# Patient Record
Sex: FEMALE | Race: WHITE | NOT HISPANIC OR LATINO | URBAN - METROPOLITAN AREA
[De-identification: names, ages, dates, MRNs, and addresses within clinical notes are randomized per-mention and may not be internally consistent; named-entity substitution may affect disease eponyms.]

---

## 2024-04-30 ENCOUNTER — TELEPHONE (OUTPATIENT)
Age: 44
End: 2024-04-30

## 2024-04-30 NOTE — TELEPHONE ENCOUNTER
Caller: Fredy    Doctor:     Reason for call: Returning call. Call for for her friend not for herself    Call back#:

## 2024-05-14 ENCOUNTER — TELEPHONE (OUTPATIENT)
Dept: SURGERY | Facility: CLINIC | Age: 44
End: 2024-05-14

## 2024-05-15 ENCOUNTER — CONSULT (OUTPATIENT)
Dept: SURGERY | Facility: CLINIC | Age: 44
End: 2024-05-15
Payer: COMMERCIAL

## 2024-05-15 VITALS
WEIGHT: 135 LBS | HEART RATE: 90 BPM | HEIGHT: 65 IN | TEMPERATURE: 98 F | OXYGEN SATURATION: 98 % | BODY MASS INDEX: 22.49 KG/M2

## 2024-05-15 DIAGNOSIS — E04.1 THYROID NODULE: ICD-10-CM

## 2024-05-15 DIAGNOSIS — E04.1 THYROID NODULE: Primary | ICD-10-CM

## 2024-05-15 PROCEDURE — 99244 OFF/OP CNSLTJ NEW/EST MOD 40: CPT | Performed by: SPECIALIST

## 2024-05-15 NOTE — LETTER
"May 30, 2024     Mercedes Dupree MD  [address] [place] [id number]    Patient: Fredy Tran   YOB: 1980   Date of Visit: 5/15/2024       Dear Mercedes,    Thank you for referring Fredy Tran to me for evaluation. Below are my notes for this consultation.    If you have questions, please do not hesitate to call me. I look forward to following your patient along with you.         Sincerely,    King Rupesh Carr MD        CC: No Recipients    Rupesh Carr MD  5/30/2024  5:21 PM  Sign when Signing Visit  Chief Complaint: Multiple thyroid nodules      History of Present Illness: The patient is a 44-year-old [place]istanian female with a fairly significant past medical history.  She has a history of AVM on 2 separate occasions.  In 2006 she underwent treatment in [place].  In 2008 she underwent treatment in [place].  She had \"brain surgery\" and underwent embolization.  She also has a history of meningioma since 2018 that has not gotten larger and is being surveilled.    She presents the office today with multiple thyroid nodules.  She had an ultrasound of the thyroid in her family physician's office that demonstrated multiple bilateral thyroid nodules.  At that time she was referred to our office for evaluation.  She does have a presumed history of hyperthyroidism.  She denies any family history of endocrine issues.  Denies any history of radiation to the face head or neck.      Past Medical History: History reviewed. No pertinent past medical history.      Past Surgical History:  History reviewed. No pertinent surgical history.      Allergies:    Allergies   Allergen Reactions   • Ceftriaxone Anaphylaxis     Had allergic rxn to Medaxonum, generic name Ceftriaxone   • Iv Contrast [Iodinated Contrast Media] Anaphylaxis   • Other Anaphylaxis     MEDAXONUM         Medications:    Current Outpatient Medications:   •  levETIRAcetam (KEPPRA) 1000 MG tablet, Take 1 tablet (1,000 mg total) by mouth daily, Disp: 90 " tablet, Rfl: 3  •  hydrocortisone (ANUSOL-HC) 2.5 % rectal cream, Apply topically 2 (two) times a day, Disp: 28 g, Rfl: 0  •  ibuprofen (MOTRIN) 400 mg tablet, Take 1 tablet (400 mg total) by mouth 3 (three) times a day with meals for 10 days, Disp: 60 tablet, Rfl: 0  •  Varenicline Tartrate, Starter, (Chantix Starting Month Pak) 0.5 MG X 11 & 1 MG X 42 TBPK, 0.5 mg once daily for 3 days then 0.5mg twice daily for days 4-7 then 1 mg twice daily (Patient not taking: Reported on 5/23/2024), Disp: 53 each, Rfl: 0      Social History:  Social History    Social History     Substance and Sexual Activity   Alcohol Use Not Currently     Social History     Substance and Sexual Activity   Drug Use Not Currently     Social History     Tobacco Use   Smoking Status Every Day   • Current packs/day: 1.00   • Types: Cigarettes   Smokeless Tobacco Never         Family History:  History reviewed. No pertinent family history.      Review of Systems:    She has a history of headaches intermittently.  Also some shoulder pain.  No weight loss weight gain fever chills night sweats chest pain nausea vomiting diarrhea constipation shortness of breath    Vitals:  Vitals:    05/15/24 1331   Pulse: 90   Temp: 98 °F (36.7 °C)   SpO2: 98%       Physical Exam:  Patient is an adult [place] female 5 foot 5 inches 135 pounds.  She is awake alert no distress.    Vital signs as above    Neck soft bilateral small nodules noted right greater than left.  Nontender to palpation.  No evidence of cervical adenopathy noted.      Lab Results: I have personally reviewed pertinent reports. See below.  Imaging: I have personally reviewed pertinent reports.   EKG, Pathology, and Other Studies: I have personally reviewed pertinent reports.     No visits with results within 1 Day(s) from this visit.   Latest known visit with results is:   No results found for any previous visit.         Impression:  Thyroid nodules.  Further investigation is needed.  The patient  needs a formal ultrasound to check the size, consistency, etc. etc. of the lesions.    Plan:  Schedule ultrasound of the thyroid gland at the earliest possible time

## 2024-05-16 ENCOUNTER — HOSPITAL ENCOUNTER (OUTPATIENT)
Dept: ULTRASOUND IMAGING | Facility: HOSPITAL | Age: 44
End: 2024-05-16
Attending: SPECIALIST
Payer: COMMERCIAL

## 2024-05-16 DIAGNOSIS — E04.1 THYROID NODULE: ICD-10-CM

## 2024-05-16 PROCEDURE — 76536 US EXAM OF HEAD AND NECK: CPT

## 2024-05-23 ENCOUNTER — OFFICE VISIT (OUTPATIENT)
Dept: GASTROENTEROLOGY | Facility: CLINIC | Age: 44
End: 2024-05-23
Payer: COMMERCIAL

## 2024-05-23 VITALS
BODY MASS INDEX: 22.66 KG/M2 | OXYGEN SATURATION: 98 % | SYSTOLIC BLOOD PRESSURE: 93 MMHG | WEIGHT: 136 LBS | HEIGHT: 65 IN | TEMPERATURE: 99.3 F | HEART RATE: 71 BPM | DIASTOLIC BLOOD PRESSURE: 62 MMHG

## 2024-05-23 DIAGNOSIS — K64.9 HEMORRHOIDS, UNSPECIFIED HEMORRHOID TYPE: ICD-10-CM

## 2024-05-23 DIAGNOSIS — K59.00 CONSTIPATION, UNSPECIFIED CONSTIPATION TYPE: Primary | ICD-10-CM

## 2024-05-23 PROCEDURE — 99244 OFF/OP CNSLTJ NEW/EST MOD 40: CPT | Performed by: DIETITIAN, REGISTERED

## 2024-05-23 RX ORDER — HYDROCORTISONE 25 MG/G
CREAM TOPICAL 2 TIMES DAILY
Qty: 28 G | Refills: 0 | Status: SHIPPED | OUTPATIENT
Start: 2024-05-23

## 2024-05-23 NOTE — PATIENT INSTRUCTIONS
Fredy Tran  5/23/2024     Recommended Total Fiber Intake**    AGE  MEN  WOMAN    19-50  38 grams/day  25 grams/day    Over 50  30 grams/day  21 grams/day    Fiber Sources in Common Foods   Use this guide to find out if you have enough fiber in you diet.    Food  Size of Serving  Fiber Grams/Servings  Calories/   Serving  Food  Size of Serving  Fiber Grams/Servings  Calories/   Serving    Fruits: (raw unless otherwise noted  Vegetables: (cooked, unless otherwise noted)    Apple (w/peel)  1 medium  3.7  81  Artichoke  1 globe  6.5  60    Apricots  1 cup  3.7  74  Asparagus  ½ cup  1.8  25    Banana  1 medium  2.7  105  Beans:    Blackberries  1 cup  7.2  75  Green (canned)  ½ cup  1.3  14    Blueberries  1 cup  3.9  81  Kidney  ½ cup  5.7  114    Cantaloupe  1 cup  1.3  56  Lima  ½ cup  6.1  85    Grapefruit  1 medium  2.8  82  Mcleod  ½ cup  7.4  118    Grapes  1 cup  1.6  114  White  ½ cup  5.5  122    Orange  1 medium  3.1  62  Beets  ½ cup  1.6  37    Pear (with peel)  1 medium  4.0  98  Broccoli  ½ cup  2.8  26    Pineapple  1 cup  1.9  76  Cabbage, green  ½ cup  2.1  16    Plums  1 medium  1.0  36  Cabbage, green (raw)  ½ cup  0.8  9    Prunes (dried)  1 cup  11.4  386  Carrots  ½ cup  2.6  35    Raspberries  1 cup  8.4  60  Cauliflower  ½ cup  2.0  17    Strawberries  1 cup  3.4  45  Cauliflower (raw)  ½ cup  1.3  13    Watermelon  1 slice  0.8  51  Celery (raw)  ½ cup  1.0  10    GRAIN PRODUCTS AND OTHERS:  Corn  ½ cup  2.0  66    Bread:  Cucumber (raw)  ½ cup  0.4  7    Belizean  1 slice  0.8  68  Eggplant  ½ cup  1.2  13    Rye  1 slice  1.6  67  Green Peas  ½ cup  4.4  62    White  1 slice  0.6  67  Lettuce, iceberg (raw)  ½ cup  0.4  4    Whole Wheat  1 slice  2.0  70  Onions (raw)  ½ cup  1.4  30    Cereal:  Potato (baked with skin)  ½ cup  1.5  66    Bran  1 ounce  9.7  70  Spinach  ½ cup  2.7  25    Corn Flakes  1 ounce  1.0  110  Tomato  ½ cup  1.0  19    Oat Bran  1 ounce  4.3  69  Zucchini  ½ cup   1.3  14    Oatmeal  1 ounce  3.0  109  METAMUCIL:    Shredded Wheat  1 ounce  2.8  102  Capsules  6 capsules  3.0  10    Crackers:  Smooth Texture Orange (sugar free)  1 tsp  3.0  20    Bladimir  1 square  0.1  27  Smooth Texture Orange (with sugar)  1 tbsp  3.0  45    Saltine  1 regular  0.1  13  Wafers  2 wafers  3.0  120    Rice:    Brown  ½ cup  1.8  108    White  ½ cup  0.3  103    Spaghetti  2 ounces  2.1  225    Almonds (roasted)  ½ cup  6.4  351    Peanuts (roasted)  ½ cup  6.1  388      ** [employer], [employer] Sciences, 2002   Track your fiber intake for five days. Use the Fiber Source Guide to find out how much fiber is in common food.     If you’re not getting your recommended amount of fiber each day, talk to your doctor about how you can increase the fiber in your diet. Example  Monday Tuesday Wednesday Thursday Friday    Food  Oatmeal    Fiber Grams  2.8    Food  Blueberries    Fiber Grams  3.9    Food  W.W. Bread    Fiber Grams  1.9    Food  W.W. Bread    Fiber Grams  1.9    Food  Apple    Fiber Grams  3.7    Food  Spaghetti    Fiber Grams  .14    Food  Corn    Fiber Grams  2.0    Food  White Bread    Fiber Grams  .6    Food    Fiber Grams    Food    Fiber Grams    Food    Fiber Grams    Food    Fiber Grams    Food    Fiber Grams    Food    Fiber Grams    Food    Fiber Grams    Food    Fiber Grams    Food    Fiber Grams    Food    Fiber Grams    Food    Fiber Grams    Food    Fiber Grams    Add numbers in each column to find your daily fiber intake.    Total Daily Fiber Intake  18.2      Too Low - Like most Americans, this example is not enough fiber. Talk to your doctor about how to add fiber to your diet.   Quick Fiber Facts    Most Americans consume only about half of the recommended fiber they need each day.    Fiber helps maintain normal bowel function, and helps prevent constipation and its potential complications. Straining and pressure from constipation may lead to  diverticular disease and hemorrhoids.    Stool softeners or stimulant laxatives only offer short-term relief of constipation, while dietary changes or fiber therapies help break the cycle of irregularity.    Diets low in saturated fat and cholesterol that include 7 grams of soluble fiber per day from psyllium husk, as in Metamucil, may reduce the risk of heart disease by lowering cholesterol. One adult dose of Metamucil has 2.4 grams of this soluable fiber.    Increase fiber intake gradually, giving the body time to adjust.       High Fiber Diet   WHAT YOU NEED TO KNOW:   What is a high-fiber diet?  A high-fiber diet includes foods that have a high amount of fiber. Fiber is the part of fruits, vegetables, and grains that is not broken down by your body. Fiber keeps your bowel movements regular. Fiber can also help lower your cholesterol level, control blood sugar in people with diabetes, and relieve constipation. Fiber can also help you control your weight because it helps you feel full faster. Most adults should eat 25 to 35 grams of fiber each day. Talk to your dietitian or healthcare provider about the amount of fiber you need.  What foods are good sources of fiber?       Foods with at least 4 grams of fiber per serving:      ? to ½ cup of high-fiber cereal (check the nutrition label on the box)    ½ cup of blackberries or raspberries    4 dried prunes    1 cooked artichoke    ½ cup of cooked legumes, such as lentils, or red, kidney, and betts beans    Foods with 1 to 3 grams of fiber per servin slice of whole-wheat, pumpernickel, or rye bread    ½ cup of cooked brown rice    4 whole-wheat crackers    1 cup of oatmeal    ½ cup of cereal with 1 to 3 grams of fiber per serving (check the nutrition label on the box)    1 small piece of fruit, such as an apple, banana, pear, kiwi, or orange    3 dates    ½ cup of canned apricots, fruit cocktail, peaches, or pears    ½ cup of raw or cooked vegetables, such as  carrots, cauliflower, cabbage, spinach, squash, or corn  What are some ways that I can increase fiber in my diet?   Choose brown or wild rice instead of white rice.     Use whole wheat flour in recipes instead of white or all-purpose flour.     Add beans and peas to casseroles or soups.     Choose fresh fruit and vegetables with peels or skins on instead of juices.    What other guidelines should I follow?   Add fiber to your diet slowly.  You may have abdominal discomfort, bloating, and gas if you add fiber to your diet too quickly.     Drink plenty of liquids as you add fiber to your diet.  You may have nausea or develop constipation if you do not drink enough water. Ask how much liquid to drink each day and which liquids are best for you.    CARE AGREEMENT:   You have the right to help plan your care. Discuss treatment options with your healthcare provider to decide what care you want to receive. You always have the right to refuse treatment. The above information is an  only. It is not intended as medical advice for individual conditions or treatments. Talk to your doctor, nurse or pharmacist before following any medical regimen to see if it is safe and effective for you.  © Copyright [employer] 2023 Information is for End User's use only and may not be sold, redistributed or otherwise used for commercial purposes.

## 2024-05-23 NOTE — PROGRESS NOTES
[facility] - Outpatient Consultation New Patient  Fredy Tran 44 y.o. female MRN: [id number]  Encounter: [id number]          ASSESSMENT AND PLAN:    1.  Constipation  2.  Hemorrhoids  Patient reports chronic abdominal bloating as well as constipation with difficulty passing bowel movements, with sensation that rectum is too tight.  She has 2-3 BMs per day on average, but has difficulty passing stools.  She denies any abdominal pain, blood in the stool, rectal bleeding, weight loss, issues with urination, hemorrhoids.  She reports she has had hemorrhoids in the past.  She has had 2 children.  She eats lots of fruits and veggies, but admits that she can likely improve her water intake.  She just recently moved here from [facility] and she has had an EGD and colonoscopy done about 3 years ago while living there, no records currently available, but she reports these were normal.    -Increase water intake to goal of at least 64 ounces daily.  -Maintain excellent fiber intake, provided handouts.  -Schedule anal manometry.  Consider referral to pelvic floor physical therapy pending results.      Follow up 3 months.    ________________________________________________________    HPI:  Fredy Tran is a 44 y.o. female with history of hyperthyroidism, thyroid nodule, seizures, and tobacco abuse who presents for evaluation of constipation.  TSH, CMP and CBC normal other than WBC 14 on 5/7/2024.    Patient is accompanied by her sister who helps with translation.  Patient also speaks English.  Patient reports chronic abdominal bloating as well as constipation with difficulty passing bowel movements, with sensation that rectum is too tight.  She has 2-3 BMs per day on average, but has difficulty passing stools.  She denies any abdominal pain, blood in the stool, rectal bleeding, weight loss, issues with urination, hemorrhoids.  She reports she has had hemorrhoids in the past.  She has had 2 children.   She eats lots of fruits and veggies, but admits that she can likely improve her water intake.  She just recently moved here from [facility] and she has had an EGD and colonoscopy done about 3 years ago while living there, no records currently available, but she reports these were normal.      REVIEW OF SYSTEMS:  10 point ROS reviewed and negative, except as above    Historical Information   No past medical history on file.  No past surgical history on file.  Social History   Social History     Substance and Sexual Activity   Alcohol Use None     Social History     Substance and Sexual Activity   Drug Use Not on file     Social History     Tobacco Use   Smoking Status Every Day    Current packs/day: 1.00    Types: Cigarettes   Smokeless Tobacco Not on file     No family history on file.    Meds/Allergies       Current Outpatient Medications:     azithromycin (Zithromax) 250 mg tablet    ibuprofen (MOTRIN) 400 mg tablet    levETIRAcetam (KEPPRA) 1000 MG tablet    Varenicline Tartrate, Starter, (Chantix Starting Month Pak) 0.5 MG X 11 & 1 MG X 42 TBPK    Allergies   Allergen Reactions    Ceftriaxone Anaphylaxis     Had allergic rxn to Medaxonum, generic name Ceftriaxone    Iv Contrast [Iodinated Contrast Media] Anaphylaxis    Other Anaphylaxis     MEDAXONUM           Objective   Wt Readings from Last 3 Encounters:   05/20/24 61.2 kg (135 lb)   05/15/24 61.2 kg (135 lb)   04/30/24 61.2 kg (135 lb)     Temp Readings from Last 3 Encounters:   05/20/24 98.3 °F (36.8 °C) (Temporal)   05/15/24 98 °F (36.7 °C) (Tympanic)   04/30/24 98.3 °F (36.8 °C) (Temporal)     BP Readings from Last 3 Encounters:   05/20/24 96/78   04/30/24 94/72   11/16/23 102/64     Pulse Readings from Last 3 Encounters:   05/20/24 97   05/15/24 90   04/30/24 90        PHYSICAL EXAM:     Physical Exam  Vitals reviewed. Exam conducted with a chaperone present.   Constitutional:       General: She is not in acute distress.     Appearance: She is well-developed.    HENT:      Head: Normocephalic and atraumatic.   Eyes:      Conjunctiva/sclera: Conjunctivae normal.   Cardiovascular:      Rate and Rhythm: Normal rate and regular rhythm.      Heart sounds: No murmur heard.  Pulmonary:      Effort: Pulmonary effort is normal. No respiratory distress.      Breath sounds: Normal breath sounds.   Abdominal:      General: Bowel sounds are normal. There is no distension.      Palpations: Abdomen is soft.      Tenderness: There is no abdominal tenderness. There is no guarding.   Genitourinary:     Rectum: No tenderness. Abnormal anal tone.      Comments: External skin tag noted.  Hypertonic anal tone.  Musculoskeletal:         General: No swelling.      Cervical back: Neck supple.   Skin:     General: Skin is warm and dry.   Neurological:      Mental Status: She is alert.   Psychiatric:         Mood and Affect: Mood normal.             Lab Results:   No visits with results within 1 Day(s) from this visit.   Latest known visit with results is:   No results found for any previous visit.       Lab Results   Component Value Date    HGB 7.1 (L) 10/31/2023       Lab Results   Component Value Date    SODIUM 142 05/07/2024    K 4.5 05/07/2024     05/07/2024    CO2 28 05/07/2024    AGAP 9 05/07/2024    BUN 17 05/07/2024    CREATININE 0.55 05/07/2024    GLUC 88 05/07/2024    CALCIUM 9.6 05/07/2024    AST 10 05/07/2024    ALT 9 05/07/2024    ALKPHOS 58 05/07/2024    TP 7.0 05/07/2024    TBILI 0.4 05/07/2024    EGFR 116 05/07/2024         Radiology Results:   US thyroid    Result Date: 5/21/2024  Narrative: THYROID ULTRASOUND INDICATION: E04.1: Nontoxic single thyroid nodule. COMPARISON: None TECHNIQUE: Ultrasound of the thyroid was performed with a high frequency linear transducer in transverse and sagittal planes including volumetric imaging sweeps as well as traditional still imaging technique. FINDINGS: Thyroid texture: Normal homogeneous smooth echotexture. Right lobe: 5.4 x 2.3 x 1.9  cm. Volume 10.9 mL Left lobe: 5.2 x 1.7 x 1.7 cm. Volume 7.2 mL Isthmus: 0.4 cm. Nodule #1. Image 11. RIGHT lower pole nodule measuring 1.8 x 1.2 x 1.8 cm. COMPOSITION: 2 points, solid or almost completely solid. Multiple small cysts are present albeit subjectively less than 25% of the nodule substance. ECHOGENICITY: 1 point, hyperechoic or isoechoic. SHAPE: 0 points, wider-than-tall. MARGIN: 0 points, smooth. ECHOGENIC FOCI: 0 points, none or large comet-tail artifacts. TI-RADS Classification: TR 3 (3 points), FNA if >2.5 cm.  Follow if >1.5 cm. There are additional nodules of lesser size and/or TI-RADS score. These do not necessitate additional evaluation based on ACR criteria.     Impression: Multinodular thyroid gland. No nodule meets current ACR criteria for requiring biopsy but follow-up ultrasound is recommended in 1 year. Reference: ACR Thyroid Imaging, Reporting and Data System (TI-RADS): White Paper of the ACR TI-RADS Committee. J AM Fuad Radiol 2017;14:587-595. Additional recommendations based on American Thyroid Association 2015 guidelines. This study demonstrates a finding requiring imaging follow-up and was documented as such in Epic. Workstation performed: [id number]     US breast right limited (diagnostic)    Result Date: 5/14/2024  Narrative: This result has an attachment that is not available. HISTORY: Patient is 44 years old and is seen for a diagnostic mammogram and ultrasound of the right breast. No relevant medical history has been documented for this patient. Surgical history includes breast biopsy and breast enhancement. No relevant family history has been documented for this patient. No relevant hormone history has been documented for this patient. FILMS COMPARED: The present examination has been compared to prior imaging studies. Mammogram Findings: A diagnostic mammogram was performed. Computer-aided detection was utilized by the radiologist in the interpretation of this examination. The  right breast is heterogeneously dense, which may obscure small masses. Additional imaging of the right breast was performed and read in conjunction with the recent baseline mammogram dated 5/6/2024.  The questioned asymmetry in the inferior right breast persists on additional mammographic imaging.  On the mediolateral view, there is question of a 2.5 cm mass in this region. Ultrasound Findings: Real-time directed ultrasound of the  breast was performed. Ultrasound evaluation of the right breast was performed and targeted to the area of initial mammographic concern.  Within the 5:00 axis, located 5-6 cm from the nipple and seen along the implant capsule, there is an anechoic avascular mass most consistent with a simple cyst.  This measures 3.3 x 0.7 x 2.4 cm in maximum dimensions and is consistent with the mass seen on the mammogram. Within the 5:30-6:00 axis, located 10-12 cm from the nipple, seen along the implant and in the region of the patient's scar,  there are patchy areas of mixed echogenicity with small cysts most consistent with areas of fibrocystic change and probably benign.    Impression: Impression: Findings in the 5:30-6:00 axis most consistent with patches of fibrocystic change and probably benign.  A 6-month follow-up targeted right breast ultrasound is recommended to confirm stability as this was a baseline study. BI-RADS Category:  3 - Probably Benign Short Interval Follow-Up in 6 Months is recommended. 5 Year Tyrer-Cuzick 8: 0.55% 10 Year Tyrer-Cuzick 8: 1.31% Lifetime Tyrer-Cuzick 8: 7.06% In patients with a documented history of breast cancer, male patients, patients legal sex is unknown or with an age less than 19 or 85 and greater a risk score will not be calculated. Based on the information provided by the patient, risk scores for breast cancer for 5 years, 10 years and lifetime was calculated using both breast density and family history.  Patients should consult with their referring  providers regarding the significance of the score, potential need for additional risk assessment and supplemental screening including whole breast ultrasound and MRI   Workstation: [id number]    Mammo diagnostic right w 3d & cad    Result Date: 5/14/2024  Narrative: This result has an attachment that is not available. HISTORY: Patient is 44 years old and is seen for a diagnostic mammogram and ultrasound of the right breast. No relevant medical history has been documented for this patient. Surgical history includes breast biopsy and breast enhancement. No relevant family history has been documented for this patient. No relevant hormone history has been documented for this patient. FILMS COMPARED: The present examination has been compared to prior imaging studies. Mammogram Findings: A diagnostic mammogram was performed. Computer-aided detection was utilized by the radiologist in the interpretation of this examination. The right breast is heterogeneously dense, which may obscure small masses. Additional imaging of the right breast was performed and read in conjunction with the recent baseline mammogram dated 5/6/2024.  The questioned asymmetry in the inferior right breast persists on additional mammographic imaging.  On the mediolateral view, there is question of a 2.5 cm mass in this region. Ultrasound Findings: Real-time directed ultrasound of the  breast was performed. Ultrasound evaluation of the right breast was performed and targeted to the area of initial mammographic concern.  Within the 5:00 axis, located 5-6 cm from the nipple and seen along the implant capsule, there is an anechoic avascular mass most consistent with a simple cyst.  This measures 3.3 x 0.7 x 2.4 cm in maximum dimensions and is consistent with the mass seen on the mammogram. Within the 5:30-6:00 axis, located 10-12 cm from the nipple, seen along the implant and in the region of the patient's scar,  there are patchy areas of mixed echogenicity  with small cysts most consistent with areas of fibrocystic change and probably benign.    Impression: Impression: Findings in the 5:30-6:00 axis most consistent with patches of fibrocystic change and probably benign.  A 6-month follow-up targeted right breast ultrasound is recommended to confirm stability as this was a baseline study. BI-RADS Category:  3 - Probably Benign Short Interval Follow-Up in 6 Months is recommended. 5 Year Tyrer-Cuzick 8: 0.55% 10 Year Tyrer-Cuzick 8: 1.31% Lifetime Tyrer-Cuzick 8: 7.06% In patients with a documented history of breast cancer, male patients, patients legal sex is unknown or with an age less than 19 or 85 and greater a risk score will not be calculated. Based on the information provided by the patient, risk scores for breast cancer for 5 years, 10 years and lifetime was calculated using both breast density and family history.  Patients should consult with their referring providers regarding the significance of the score, potential need for additional risk assessment and supplemental screening including whole breast ultrasound and MRI   Workstation: [id number]    Mammo screening bilateral w 3d & cad    Result Date: 5/6/2024  Narrative: This result has an attachment that is not available. HISTORY: Patient is 44 years old and is seen for a screening mammogram of both breasts. No relevant medical history has been documented for this patient. Surgical history includes breast biopsy. No relevant family history has been documented for this patient. No relevant hormone history has been documented for this patient. FILMS COMPARED: This is a baseline study. MAMMOGRAM FINDINGS: A minimum of two views were obtained. 3D tomosynthesis was performed. Computer-aided detection was utilized by the radiologist in the interpretation of this examination. There is an asymmetry in the lower right breast, 5 cm from the nipple.   This is only seen on the MLO view.   There are no suspicious findings  within the contralateral breast. There are multiple bilateral similar appearing circumscribed isodense masses compatible with cysts.  There are bilateral retropectoral silicone implants. The breasts are extremely dense, which lowers the sensitivity of mammography.    Impression: Impression: Right asymmetry.  Diagnostic mammogram is recommended with ultrasound if needed. BI-RADS Category:  0 - Incomplete: Need Additional Imaging Evaluation Additional Imaging is recommended. [facility] will recall the patient for additional evaluation.   5 Year Tyrer-Cuzick 8: 0.81% 10 Year Tyrer-Cuzick 8: 1.93% Lifetime Tyrer-Cuzick 8: 10.25% In patients with a documented history of breast cancer, male patients, patients legal sex is unknown or with an age less than 19 or 85 and greater a risk score will not be calculated. Based on the information provided by the patient, risk scores for breast cancer for 5 years, 10 years and lifetime was calculated using both breast density and family history.  Patients should consult with their referring providers regarding the significance of the score, potential need for additional risk assessment and supplemental screening including whole breast ultrasound and MRI Workstation: [id number]

## 2024-05-24 ENCOUNTER — TELEPHONE (OUTPATIENT)
Dept: GASTROENTEROLOGY | Facility: HOSPITAL | Age: 44
End: 2024-05-24

## 2024-05-29 ENCOUNTER — TELEPHONE (OUTPATIENT)
Age: 44
End: 2024-05-29

## 2024-05-30 NOTE — PROGRESS NOTES
"Chief Complaint: Multiple thyroid nodules      History of Present Illness: The patient is a 44-year-old Palestinian female with a fairly significant past medical history.  She has a history of AVM on 2 separate occasions.  In 2006 she underwent treatment in [place].  In 2008 she underwent treatment in [place].  She had \"brain surgery\" and underwent embolization.  She also has a history of meningioma since 2018 that has not gotten larger and is being surveilled.    She presents the office today with multiple thyroid nodules.  She had an ultrasound of the thyroid in her family physician's office that demonstrated multiple bilateral thyroid nodules.  At that time she was referred to our office for evaluation.  She does have a presumed history of hyperthyroidism.  She denies any family history of endocrine issues.  Denies any history of radiation to the face head or neck.      Past Medical History: History reviewed. No pertinent past medical history.      Past Surgical History:  History reviewed. No pertinent surgical history.      Allergies:    Allergies   Allergen Reactions    Ceftriaxone Anaphylaxis     Had allergic rxn to Medaxonum, generic name Ceftriaxone    Iv Contrast [Iodinated Contrast Media] Anaphylaxis    Other Anaphylaxis     MEDAXONUM         Medications:    Current Outpatient Medications:     levETIRAcetam (KEPPRA) 1000 MG tablet, Take 1 tablet (1,000 mg total) by mouth daily, Disp: 90 tablet, Rfl: 3    hydrocortisone (ANUSOL-HC) 2.5 % rectal cream, Apply topically 2 (two) times a day, Disp: 28 g, Rfl: 0    ibuprofen (MOTRIN) 400 mg tablet, Take 1 tablet (400 mg total) by mouth 3 (three) times a day with meals for 10 days, Disp: 60 tablet, Rfl: 0    Varenicline Tartrate, Starter, (Chantix Starting Month Pak) 0.5 MG X 11 & 1 MG X 42 TBPK, 0.5 mg once daily for 3 days then 0.5mg twice daily for days 4-7 then 1 mg twice daily (Patient not taking: Reported on 5/23/2024), Disp: 53 each, Rfl: 0      Social History:  " Social History     Social History     Substance and Sexual Activity   Alcohol Use Not Currently     Social History     Substance and Sexual Activity   Drug Use Not Currently     Social History     Tobacco Use   Smoking Status Every Day    Current packs/day: 1.00    Types: Cigarettes   Smokeless Tobacco Never         Family History:  History reviewed. No pertinent family history.      Review of Systems:    She has a history of headaches intermittently.  Also some shoulder pain.  No weight loss weight gain fever chills night sweats chest pain nausea vomiting diarrhea constipation shortness of breath    Vitals:  Vitals:    05/15/24 1331   Pulse: 90   Temp: 98 °F (36.7 °C)   SpO2: 98%       Physical Exam:  Patient is an adult Sudanese female 5 foot 5 inches 135 pounds.  She is awake alert no distress.    Vital signs as above    Neck soft bilateral small nodules noted right greater than left.  Nontender to palpation.  No evidence of cervical adenopathy noted.      Lab Results: I have personally reviewed pertinent reports. See below.  Imaging: I have personally reviewed pertinent reports.   EKG, Pathology, and Other Studies: I have personally reviewed pertinent reports.     No visits with results within 1 Day(s) from this visit.   Latest known visit with results is:   No results found for any previous visit.         Impression:  Thyroid nodules.  Further investigation is needed.  The patient needs a formal ultrasound to check the size, consistency, etc. etc. of the lesions.    Plan:  Schedule ultrasound of the thyroid gland at the earliest possible time

## 2024-06-04 ENCOUNTER — HOSPITAL ENCOUNTER (OUTPATIENT)
Dept: GASTROENTEROLOGY | Facility: HOSPITAL | Age: 44
Discharge: HOME | End: 2024-06-04
Payer: COMMERCIAL

## 2024-06-04 VITALS
SYSTOLIC BLOOD PRESSURE: 105 MMHG | TEMPERATURE: 98.7 F | HEART RATE: 77 BPM | RESPIRATION RATE: 16 BRPM | OXYGEN SATURATION: 100 % | DIASTOLIC BLOOD PRESSURE: 62 MMHG

## 2024-06-04 DIAGNOSIS — K59.00 CONSTIPATION, UNSPECIFIED CONSTIPATION TYPE: ICD-10-CM

## 2024-06-04 PROCEDURE — 91122 HB ANAL PRESSURE RECORD: CPT

## 2024-06-04 NOTE — PERIOPERATIVE NURSING NOTE
Patient brought in the room and educated on procedure. .Patient reported she did the fleets enema and it was effective.  Patient instructed lay in the stretcher facing the left side of the stretcher with legs in a fetal position. Anal manometry catheter inserted via anal canal to rectum and test performed. Patient tolerated procedure. Catheter removed intact. Balloon expulsion test performed and a single use balloon inserted via anal canal to rectum and 50 cc room temperature water used to inflate the balloon. Patient assisted to the commode and instructed to push out the balloon. Patient expelled the balloon in  3 minutes time frame with some small mucus brown stool. Patient tolerated procedure. Catheter removed intact. Patient discharged from room and ambulated out of the room in stable condition.

## 2024-06-14 DIAGNOSIS — K59.00 CONSTIPATION, UNSPECIFIED CONSTIPATION TYPE: ICD-10-CM

## 2024-06-14 DIAGNOSIS — K59.02 DYSSYNERGIC DEFECATION: Primary | ICD-10-CM

## 2024-06-17 ENCOUNTER — TELEPHONE (OUTPATIENT)
Dept: GASTROENTEROLOGY | Facility: CLINIC | Age: 44
End: 2024-06-17

## 2024-06-17 NOTE — TELEPHONE ENCOUNTER
I was able to speak with pt and have related results given from provider, pt has verbalized and understood results given.

## 2024-06-17 NOTE — TELEPHONE ENCOUNTER
"----- Message from Blayne Francis PA-C sent at 6/14/2024  3:15 PM EDT -----  Hello, can you please call the patient (with Vietnamese ) to explain that her anal manometry test showed \"dyssynergic defecation\", AKA imbalances in her pelvic floor muscles, which can contribute to her chronic constipation and difficulty passing stools.  I recommend she complete pelvic floor physical therapy.  I provided a referral, and she can call to schedule her appointments.  Thank you so much!  "

## 2024-06-18 ENCOUNTER — EVALUATION (OUTPATIENT)
Dept: PHYSICAL THERAPY | Facility: REHABILITATION | Age: 44
End: 2024-06-18
Payer: COMMERCIAL

## 2024-06-18 DIAGNOSIS — K59.02 DYSSYNERGIC DEFECATION: ICD-10-CM

## 2024-06-18 DIAGNOSIS — K59.00 CONSTIPATION, UNSPECIFIED CONSTIPATION TYPE: ICD-10-CM

## 2024-06-18 PROCEDURE — 97161 PT EVAL LOW COMPLEX 20 MIN: CPT

## 2024-06-18 PROCEDURE — 97530 THERAPEUTIC ACTIVITIES: CPT

## 2024-06-18 NOTE — PROGRESS NOTES
PT Evaluation     Today's date: 2024  Patient name: Fredy Tran  : 1980  MRN: [id number]  Referring provider: Blayne Francis PA-C  Dx:   Encounter Diagnosis     ICD-10-CM    1. Constipation, unspecified constipation type  K59.00 Ambulatory Referral to Physical Therapy      2. Dyssynergic defecation  K59.02 Ambulatory Referral to Physical Therapy          Start Time: 1500  Stop Time: 1600  Total time in clinic (min): 60 minutes    Assessment    Assessment details: Fredy Tran is a 44 y.o.  female with complaints of constipation.  Patient's presentation is consistent with mild PFM dysfunction with the following notable impairments found on evaluation: PFM weakness, impaired PFM coordination , diminished PFM relaxation ROM, poor breathing mechanics, and poor bear down mechanics. These impairments contribute to the following functional limitations: decreased tolerance to increases pt distress, impaired bowel function, and impairs QOL.  Fredy Tran is a good candidate for physical therapy and would benefit from skilled physical therapy to guide progressive interventions to address the above impairments in order to allow the patient to achieve the goals listed and return to prior level of function. POC: PFM coordination, PFM strengthening, defecation mechanics, manual cueing, breathing mechanics, balloon training, and functional strengthening.    During initial evaluation, education was provided on anatomy and function of the pelvic floor muscles and provided written and verbal consent for pelvic floor muscle exam. Patient also educated on diagnosis, plan of care and prognosis. Pt is in agreement with recommended plan of care and goals for therapy, and demonstrates motivation for active participation in proposed plan of care.      Goals  Dysfunction:   In 10 weeks, patient will demonstrate improved PFM strength to at least 4/5.  In 10 weeks, patient will demonstrate improved PFM endurance to 8 sec  of 3/5 strength or greater.   In 10 weeks, patient will demonstrate improved PFM relaxation to at least 90% or greater.     Bowel:  In 10 weeks, patient will report improvement in defecatory function as evidenced by no straining required for 100% of BM.  In 4 weeks, patient will perform x2 consecutive bear downs with proper breathing mechanics and 50% or greater PFM relaxation.      Plan    Frequency: 1x week  Duration in weeks: 10  Plan of Care beginning date: 2024  Plan of Care expiration date: 2024        Subjective      Chief Complaint: Constipation  HPI: Pt presents to PT with 8 month history of constipation and straining during BM. Pt notes some improvement associated w/ diet - however has not resolved. Anal manometry findings positive for dyssynergic defecation. Pt moved from [facility] ~ 8 months ago (pt speak Thai, Sami, and English)        Urinary:     Denies: AARON , UUI, dysuria, hesitancy, frequency, weak stream, sensation of incomplete voiding, post-void, nocturia, and nocturnal enuresis   Fluid intake: 32  oz Water, Nescafe 24 oz    Bowel:     Onset: 7 months ago w/ after anesthia event   Symptoms Present:   Straining - 50% - improves w/ diet   Hemorrhoids   Reports type: 4-6.  Denies bleeding, constipation, diarrhea , fecal smearing, and painful evacuation   GYN:      with  deliveries   Sexual Function:     Currently sexually active? No   MSK:      Current exercise: No   Pain:      Denies pain.    Goals:     Improve bowel function          Objective       Abdominal Assessment:      Abdominal Assessment: No tenderness  Scars present - healing well and not tender   Stiches palpable 2 in below umbilicus and 1 cm above umbilicus   - denies tenderness  Curl up: 0    Diastatis   Diastasis recti present? no       General Perineum Exam:   Positive for hemorrhoids and no pelvic organ prolapse at rest.   Negative for swelling, gaping introitus and perianal erythema    Visual Inspection of  Perineum:   Cotton swab test: non-tender    Pelvic Organ Prolapse   Position: hook-lying  At rest: none  With bearing down: none  Perineal body inspection: within normal limits        Pelvic Floor Muscle Exam:      Breathing pattern with contraction: holding breath   70% pelvic floor relaxation        PERFECT Score   Power right: 3/5   Power left: 3/5   Endurance (seconds to max): 5    Fast flicks (in 10 seconds): 3   Perfect Score: Mild paradoxical contraction w/ bear down        Rectal Pelvic Floor Muscle Exam  External anal sphincter: Normall tone  External anal sphincter: hemorrhoids    pelvic floor exam consent given by patient    Pelvic exam completed: vaginally and rectally                Precautions: Standard     Diagnosis:    POC expires (Date that your POC expires) Auth Status? (BOMN, approved, pending) Unit limit (Daily) Auth Start date Expiration date PT/OT + Visit Limit?   8/27/2024           Date of Service 6/18        Visits Used 1        Visits Remaining           [employer] Created 6/18                 Neuro Re-Ed         Urge deferral         Breathing Mechanics  **       PFM Coordination  **       PFM Down Training          Internal Cueing                  Ther Ex         PFM Strengthening         Hip strengthening         Functional Strengthening         Abdominal Strengthening         Dilator Training         Aerobic         Therapeutic Rest Breaks                  Ther Activity         PFM Education         Voiding Diaries          Defecation Mechanics Educated and provided handout  ** pt may benefit from biofeedbakc training w/ commode        Fluid Intake          Review of Sxs                  Manual Ther         PFM exam Performed         Ortho exam         PFM Manuals                           Modalities                           Patient Education                  Outcome Measure

## 2024-06-18 NOTE — TELEPHONE ENCOUNTER
Called and spoke with patient.Discussed results and recommendations.All questions answered.Patient verbalized understanding.

## 2024-06-18 NOTE — TELEPHONE ENCOUNTER
Patients GI provider:  JIM Francis    Number to return call: [phone]    Reason for call: Pt calling stating she discussed her results w/ someone yesterday but she was driving. Pt wants to discuss again, please reach out to pt.    Scheduled procedure/appointment date if applicable: Apt 8/23/24

## 2024-06-27 ENCOUNTER — APPOINTMENT (OUTPATIENT)
Dept: PHYSICAL THERAPY | Facility: REHABILITATION | Age: 44
End: 2024-06-27
Payer: COMMERCIAL

## 2024-07-05 ENCOUNTER — OFFICE VISIT (OUTPATIENT)
Dept: PHYSICAL THERAPY | Facility: REHABILITATION | Age: 44
End: 2024-07-05
Payer: COMMERCIAL

## 2024-07-05 DIAGNOSIS — K59.00 CONSTIPATION, UNSPECIFIED CONSTIPATION TYPE: Primary | ICD-10-CM

## 2024-07-05 DIAGNOSIS — K59.02 DYSSYNERGIC DEFECATION: ICD-10-CM

## 2024-07-05 PROCEDURE — 97110 THERAPEUTIC EXERCISES: CPT

## 2024-07-05 PROCEDURE — 97140 MANUAL THERAPY 1/> REGIONS: CPT

## 2024-07-05 NOTE — PROGRESS NOTES
Daily Note     Today's date: 2024  Patient name: Fredy Tran  : 1980  MRN: [id number]  Referring provider: Blayne Francis PA-C  Dx:   Encounter Diagnosis     ICD-10-CM    1. Constipation, unspecified constipation type  K59.00       2. Dyssynergic defecation  K59.02           Start Time: 1300  Stop Time: 1345  Total time in clinic (min): 45 minutes    Chief Complaint: Constipation  HPI: Pt presents to PT with 8 month history of constipation and straining during BM. Pt notes some improvement associated w/ diet - however has not resolved. Anal manometry findings positive for dyssynergic defecation. Pt moved from [facility] ~ 8 months ago (pt speak Kazakh, Azeri, and English)           Subjective: Pt feels it may be improving.  Continues to depend on what she eats and drinks.      Objective: See treatment diary below      Assessment: Tolerated treatment well. Patient would benefit from continued PT. Discussed how to perform rectal stretching. Encouraged to share with us if she wants us do it next session.  Good tolerance to bowel massage and fascia decompression over lower abdominal scar with good gurgling sounds  Discussed how stress can play a roll in her ability to relax her pelvic floor muscles.  Challenged with practicing DB, benefits from self tactile cueing over her chest and abdomen.      Plan: Continue per plan of care.      Precautions: Standard     Diagnosis:    POC expires (Date that your POC expires) Auth Status? (BOMN, approved, pending) Unit limit (Daily) Auth Start date Expiration date PT/OT + Visit Limit?   2024           Date of Service        Visits Used 1 2       Visits Remaining           [facility]                  Neuro Re-Ed         Urge deferral         Breathing Mechanics  10'       PFM Coordination  Pelvic elevators       PFM Down Training          Internal Cueing                  Ther Ex         PFM Strengthening         Hip strengthening          Functional Strengthening         Abdominal Strengthening         Dilator Training         Aerobic         Therapeutic Rest Breaks                  Ther Activity         PFM Education         Voiding Diaries          Defecation Mechanics Educated and provided handout  ** pt may benefit from biofeedbakc training w/ commode        Fluid Intake          Review of Sxs                  Manual Ther         PFM exam Performed         Ortho exam         PFM Manuals           Bowel massage & cupping over scar 25'                Modalities                           Patient Education                  Outcome Measure

## 2024-07-12 ENCOUNTER — OFFICE VISIT (OUTPATIENT)
Dept: PHYSICAL THERAPY | Facility: REHABILITATION | Age: 44
End: 2024-07-12
Payer: COMMERCIAL

## 2024-07-12 DIAGNOSIS — K59.02 DYSSYNERGIC DEFECATION: ICD-10-CM

## 2024-07-12 DIAGNOSIS — K59.00 CONSTIPATION, UNSPECIFIED CONSTIPATION TYPE: Primary | ICD-10-CM

## 2024-07-12 PROCEDURE — 97110 THERAPEUTIC EXERCISES: CPT

## 2024-07-12 PROCEDURE — 97112 NEUROMUSCULAR REEDUCATION: CPT

## 2024-07-12 NOTE — PROGRESS NOTES
Daily Note     Today's date: 2024  Patient name: Fredy Tran  : 1980  MRN: [id number]  Referring provider: Blayne Francis PA-C  Dx:   Encounter Diagnosis     ICD-10-CM    1. Constipation, unspecified constipation type  K59.00       2. Dyssynergic defecation  K59.02             Start Time: 1400  Stop Time: 1500  Total time in clinic (min): 60 minutes    Chief Complaint: Constipation  HPI: Pt presents to PT with 8 month history of constipation and straining during BM. Pt notes some improvement associated w/ diet - however has not resolved. Anal manometry findings positive for dyssynergic defecation. Pt moved from [facility] ~ 8 months ago.          Subjective: Pt feels she is able to have some pain free and easy BM, but struggles when abdominal pain is present.       Objective: See treatment diary below      Assessment: Tolerated treatment well. Patient would benefit from continued PT. Reinforced proper defecation mechanics. Introduced PFM coordination and breathing tactics in multiple positions. Reinforced proper bowel massage techniques. HEP  - PFM coordination and proper defecation mechanics and breathing coordination.     Plan: Continue per plan of care.      Precautions: Standard     Diagnosis:    POC expires (Date that your POC expires) Auth Status? (BOMN, approved, pending) Unit limit (Daily) Auth Start date Expiration date PT/OT + Visit Limit?   2024           Date of Service       Visits Used 1 2 3      Visits Remaining           [employer] Created                  Neuro Re-Ed         Urge deferral         Breathing Mechanics  10' PFM coordination 4'4'   10'       PFM Coordination  Pelvic elevators Pelvic drop 15'      PFM Down Training          Internal Cueing                  Ther Ex         PFM Strengthening         Hip strengthening         Functional Strengthening   PFM contraction and relaxation in:     Deep lunge    Divya pose     Butterfly     Anterior lean      Posterior lean     Standing     Physioball     Sitting           Abdominal Strengthening         Dilator Training         Aerobic         Therapeutic Rest Breaks                  Ther Activity         PFM Education         Voiding Diaries          Defecation Mechanics Educated and provided handout   ** pt may benefit from biofeedbakc training w/ commode       Fluid Intake          Review of Sxs                  Manual Ther         PFM exam Performed         Ortho exam         PFM Manuals           Bowel massage & cupping over scar 25' Reinforced               Modalities                           Patient Education                  Outcome Measure

## 2024-07-26 ENCOUNTER — OFFICE VISIT (OUTPATIENT)
Dept: PHYSICAL THERAPY | Facility: REHABILITATION | Age: 44
End: 2024-07-26
Payer: COMMERCIAL

## 2024-07-26 DIAGNOSIS — K59.00 CONSTIPATION, UNSPECIFIED CONSTIPATION TYPE: Primary | ICD-10-CM

## 2024-07-26 DIAGNOSIS — K59.02 DYSSYNERGIC DEFECATION: ICD-10-CM

## 2024-07-26 PROCEDURE — 97110 THERAPEUTIC EXERCISES: CPT

## 2024-07-26 PROCEDURE — 97112 NEUROMUSCULAR REEDUCATION: CPT

## 2024-07-26 NOTE — PROGRESS NOTES
Discharge      Today's date: 2024  Patient name: Fredy Tran  : 1980  MRN: [id number]  Referring provider: Blayne Francis PA-C  Dx:   Encounter Diagnosis     ICD-10-CM    1. Constipation, unspecified constipation type  K59.00       2. Dyssynergic defecation  K59.02               Start Time: 1500  Stop Time: 1600  Total time in clinic (min): 60 minutes    Chief Complaint: Constipation  HPI: Pt presents to PT with 8 month history of constipation and straining during BM. Pt notes some improvement associated w/ diet - however has not resolved. Anal manometry findings positive for dyssynergic defecation. Pt moved from [facility] ~ 8 months ago.          Subjective: pt reports purchase of abdominal massager from [employer] w/ reported improvement in sxs. Additionally, pt reported ability to install bidet for stimulation. Following session, pt reports she feels equipped to self manage and will reach out if sxs do not resolve.     Objective: See treatment diary below      Assessment: Tolerated treatment well. Biofeedback training w/ commode training w/ paradoxical breathing w/ bear down that improved w/ repetition. Reinforced PFM relaxation w/ global stretching mechanics w/ good performance. Patient would benefit from continued PT. Patient reports goals have been met and is equipped to transition to self management. Pt verbalized understanding of HEP and proper progression of interventions. Plan to discharge at this time.          Plan: Discharge     Precautions: Standard     Diagnosis:    POC expires (Date that your POC expires) Auth Status? (BOMN, approved, pending) Unit limit (Daily) Auth Start date Expiration date PT/OT + Visit Limit?   2024           Date of Service      Visits Used 1 2 3 4     Visits Remaining           [employer] Created                  Neuro Re-Ed         Urge deferral         Breathing Mechanics  10' PFM coordination 4'4'   10'       PFM Coordination  Pelvic  elevators Pelvic drop 15'      PFM Down Training          Internal Cueing             Biofeedback training in sitting and over commode   30'      Ther Ex         PFM Strengthening         Hip strengthening         Functional Strengthening   PFM contraction and relaxation in:     Deep lunge    Divya pose     Butterfly     Anterior lean     Posterior lean     Standing     Physioball     Sitting      PFM contraction and relaxation in:     Deep lunge    Divya pose     Butterfly     Anterior lean     Posterior lean     Standing     Physioball     Sitting     25'      Abdominal Strengthening         Dilator Training         Aerobic         Therapeutic Rest Breaks                  Ther Activity         PFM Education         Voiding Diaries          Defecation Mechanics Educated and provided handout         Fluid Intake          Review of Sxs    Performed               Manual Ther         PFM exam Performed         Ortho exam         PFM Manuals           Bowel massage & cupping over scar 25' Reinforced               Modalities                           Patient Education                  Outcome Measure

## 2024-08-16 ENCOUNTER — TELEPHONE (OUTPATIENT)
Dept: GASTROENTEROLOGY | Facility: CLINIC | Age: 44
End: 2024-08-16

## 2024-08-16 NOTE — TELEPHONE ENCOUNTER
I spoke with the pt regarding the incoming Follow up apt -08/23/24 and informed pt we still @ [place].

## 2024-08-23 ENCOUNTER — OFFICE VISIT (OUTPATIENT)
Dept: GASTROENTEROLOGY | Facility: CLINIC | Age: 44
End: 2024-08-23
Payer: COMMERCIAL

## 2024-08-23 VITALS
HEART RATE: 82 BPM | BODY MASS INDEX: 21.34 KG/M2 | HEIGHT: 66 IN | TEMPERATURE: 98.8 F | SYSTOLIC BLOOD PRESSURE: 91 MMHG | WEIGHT: 132.8 LBS | DIASTOLIC BLOOD PRESSURE: 60 MMHG | OXYGEN SATURATION: 99 %

## 2024-08-23 DIAGNOSIS — K42.9 PERIUMBILICAL HERNIA: ICD-10-CM

## 2024-08-23 DIAGNOSIS — K59.09 CHRONIC CONSTIPATION: Primary | ICD-10-CM

## 2024-08-23 PROCEDURE — 99213 OFFICE O/P EST LOW 20 MIN: CPT | Performed by: DIETITIAN, REGISTERED

## 2024-08-23 RX ORDER — POLYETHYLENE GLYCOL 3350 17 G/17G
17 POWDER, FOR SOLUTION ORAL DAILY
Qty: 510 G | Refills: 3 | Status: SHIPPED | OUTPATIENT
Start: 2024-08-23

## 2024-08-23 RX ORDER — IBUPROFEN 600 MG/1
TABLET ORAL
COMMUNITY
Start: 2024-05-30

## 2024-08-23 RX ORDER — LEVETIRACETAM 500 MG/1
500 TABLET ORAL 2 TIMES DAILY
COMMUNITY
Start: 2024-08-10

## 2024-08-23 NOTE — PROGRESS NOTES
[facility] - Outpatient Follow-up Note  Fredy Tran 44 y.o. female MRN: [id number]  Encounter: [id number]          ASSESSMENT AND PLAN:    1.  Chronic constipation  2.  Periumbilical hernia  Patient has history of constipation with difficulty passing bowel movements.  She has 2-3 BMs per day on average, but has difficulty passing stools.  She eats lots of fruits and veggies, but admits that she can likely improve her water intake.  Anal manometry 6/4/2024 revealed normal resting tone and adequate squeeze pressures, type I dyssynergia, and impaired rectal sensation.  Patient has now established with pelvic floor PT but she has not seen significant improvement of her symptoms yet.  She has noticed that constipation worsens around the time of ovulation and around the time of her menses.  She also notes a bulge near her umbilicus that is painful/tender especially with things like her waistband of her pants.     -Increase water intake to goal of at least 64 ounces daily.  -Maintain excellent fiber intake.  -Continue with pelvic floor PT and home exercises.  Continue to use squatty potty.  -Recommend starting MiraLAX 17 g once daily.  -Check abdominal wall ultrasound for further evaluation of suspected periumbilical hernia.  -Advised patient to call the office or send a [employer] message with any questions/concerns or any new/worsening symptoms.      Follow up 3 months.    __________________________________________________________    SUBJECTIVE:  Fredy Tran is a 44 y.o. female with history of hyperthyroidism, thyroid nodule, seizures, and tobacco abuse who presents for follow up of constipation and hemorrhoids.  Last office visit 5/2024.    Patient has history of constipation with difficulty passing bowel movements.  She has 2-3 BMs per day on average, but has difficulty passing stools.  She eats lots of fruits and veggies, but admits that she can likely improve her water intake.  Patient  has now established with pelvic floor PT but she has not seen significant improvement of her symptoms yet.  She has noticed that constipation worsens around the time of ovulation and around the time of her menses.  She also notes a bulge near her umbilicus that is painful/tender especially with things like her waistband of her pants.    REVIEW OF SYSTEMS:  10 point ROS reviewed and negative, except as above      Historical Information   Past Medical History:   Diagnosis Date   • Constipation    • Hemorrhoids      Past Surgical History:   Procedure Laterality Date   • COLONOSCOPY  2020   • UPPER GASTROINTESTINAL ENDOSCOPY  2020     Social History   Social History     Substance and Sexual Activity   Alcohol Use Not Currently     Social History     Substance and Sexual Activity   Drug Use Not Currently     Social History     Tobacco Use   Smoking Status Every Day   • Current packs/day: 1.00   • Types: Cigarettes   Smokeless Tobacco Never     History reviewed. No pertinent family history.    Meds/Allergies       Current Outpatient Medications:   •  hydrocortisone (ANUSOL-HC) 2.5 % rectal cream  •   MG tablet  •  levETIRAcetam (KEPPRA) 1000 MG tablet  •  levETIRAcetam (KEPPRA) 500 mg tablet  •  polyethylene glycol (GLYCOLAX) 17 GM/SCOOP powder  •  Varenicline Tartrate, Starter, 0.5 MG X 11 & 1 MG X 42 TBPK  •  ibuprofen (MOTRIN) 400 mg tablet    Allergies   Allergen Reactions   • Ceftriaxone Anaphylaxis     Had allergic rxn to Medaxonum, generic name Ceftriaxone   • Iv Contrast [Iodinated Contrast Media] Anaphylaxis   • Other Anaphylaxis     MEDAXONUM           Objective     Wt Readings from Last 3 Encounters:   08/23/24 60.2 kg (132 lb 12.8 oz)   08/20/24 60.3 kg (133 lb)   05/23/24 61.7 kg (136 lb)     Temp Readings from Last 3 Encounters:   08/23/24 98.8 °F (37.1 °C) (Tympanic)   06/04/24 98.7 °F (37.1 °C) (Oral)   05/23/24 99.3 °F (37.4 °C) (Tympanic)     BP Readings from Last 3 Encounters:   08/23/24 91/60    08/20/24 106/78   06/04/24 105/62     Pulse Readings from Last 3 Encounters:   08/23/24 82   08/20/24 95   06/04/24 77          PHYSICAL EXAM:      Physical Exam  Vitals reviewed.   Constitutional:       General: She is not in acute distress.     Appearance: She is well-developed.   HENT:      Head: Normocephalic and atraumatic.   Eyes:      Conjunctiva/sclera: Conjunctivae normal.   Cardiovascular:      Rate and Rhythm: Normal rate and regular rhythm.      Heart sounds: No murmur heard.  Pulmonary:      Effort: Pulmonary effort is normal. No respiratory distress.      Breath sounds: Normal breath sounds.   Abdominal:      General: Bowel sounds are normal. There is no distension.      Palpations: Abdomen is soft.      Tenderness: There is no abdominal tenderness. There is no guarding.      Hernia: A hernia is present. Hernia is present in the umbilical area.   Musculoskeletal:         General: No swelling.      Cervical back: Neck supple.   Skin:     General: Skin is warm and dry.   Neurological:      Mental Status: She is alert.   Psychiatric:         Mood and Affect: Mood normal.          Lab Results:   No visits with results within 1 Day(s) from this visit.   Latest known visit with results is:   No results found for any previous visit.       Lab Results   Component Value Date    HGB 7.1 (L) 10/31/2023       Lab Results   Component Value Date    SODIUM 142 05/07/2024    K 4.5 05/07/2024     05/07/2024    CO2 28 05/07/2024    AGAP 9 05/07/2024    BUN 17 05/07/2024    CREATININE 0.55 05/07/2024    GLUC 88 05/07/2024    CALCIUM 9.6 05/07/2024    AST 10 05/07/2024    ALT 9 05/07/2024    ALKPHOS 58 05/07/2024    TP 7.0 05/07/2024    TBILI 0.4 05/07/2024    EGFR 116 05/07/2024         Radiology Results:   XR shoulder 2+ vw right    Result Date: 7/30/2024  Narrative: This result has an attachment that is not available. Report limited to Orthopaedic interpretation Three-view right shoulder Indication: right  shoulder pain. 3 views of the right shoulder do not reveal a fracture or dislocation. Glenohumeral joint space appears maintained. No abnormal soft tissue calcifications are seen.    Impression: Impression: Unremarkable 3 view right shoulder.

## 2024-09-04 ENCOUNTER — HOSPITAL ENCOUNTER (OUTPATIENT)
Dept: ULTRASOUND IMAGING | Facility: HOSPITAL | Age: 44
Discharge: HOME | End: 2024-09-04
Payer: COMMERCIAL

## 2024-09-04 DIAGNOSIS — K42.9 PERIUMBILICAL HERNIA: ICD-10-CM

## 2024-09-04 PROCEDURE — 76705 ECHO EXAM OF ABDOMEN: CPT

## 2024-09-09 DIAGNOSIS — K42.9 PERIUMBILICAL HERNIA: Primary | ICD-10-CM

## 2024-09-11 ENCOUNTER — TELEPHONE (OUTPATIENT)
Dept: GASTROENTEROLOGY | Facility: CLINIC | Age: 44
End: 2024-09-11

## 2024-09-11 NOTE — TELEPHONE ENCOUNTER
----- Message from Blayne Francis PA-C sent at 9/9/2024  4:15 PM EDT -----  Please notify patient that her abdominal wall ultrasound shows evidence of a small hernia near her bellybutton.  Since this is causing her some pain/discomfort, I placed a referral to general surgery for her to schedule an appointment to discuss a possible elective hernia repair.  Please let me know if she has any questions or concerns!  Thank you so much!

## 2024-09-11 NOTE — TELEPHONE ENCOUNTER
I Spoke with pt and have related the results from provider. Pt has verbalized and understood results given. Pt stated that she has already scheduled her apt. Provided phone number with pt if she has any questions or concerns.

## 2024-09-18 ENCOUNTER — CONSULT (OUTPATIENT)
Dept: SURGERY | Facility: CLINIC | Age: 44
End: 2024-09-18
Payer: COMMERCIAL

## 2024-09-18 VITALS
OXYGEN SATURATION: 99 % | WEIGHT: 134 LBS | HEIGHT: 66 IN | HEART RATE: 80 BPM | BODY MASS INDEX: 21.53 KG/M2 | DIASTOLIC BLOOD PRESSURE: 70 MMHG | TEMPERATURE: 97.6 F | RESPIRATION RATE: 16 BRPM | SYSTOLIC BLOOD PRESSURE: 102 MMHG

## 2024-09-18 DIAGNOSIS — K42.9 PERIUMBILICAL HERNIA: ICD-10-CM

## 2024-09-18 PROCEDURE — 99243 OFF/OP CNSLTJ NEW/EST LOW 30: CPT | Performed by: SURGERY

## 2024-09-18 RX ORDER — LEVOFLOXACIN 5 MG/ML
500 INJECTION, SOLUTION INTRAVENOUS ONCE
Status: CANCELLED | OUTPATIENT
Start: 2024-10-14

## 2024-09-24 PROBLEM — K42.9 PERIUMBILICAL HERNIA: Status: ACTIVE | Noted: 2024-09-24

## 2024-09-24 RX ORDER — SODIUM CHLORIDE, SODIUM LACTATE, POTASSIUM CHLORIDE, CALCIUM CHLORIDE 600; 310; 30; 20 MG/100ML; MG/100ML; MG/100ML; MG/100ML
125 INJECTION, SOLUTION INTRAVENOUS CONTINUOUS
OUTPATIENT
Start: 2024-10-14

## 2024-09-24 NOTE — H&P (VIEW-ONLY)
Ambulatory Visit  Name: Fredy Tran      : 1980      MRN: [id number]  Encounter Provider: Roman Almeida MD  Encounter Date: 2024   Encounter department: [facility]    Assessment & Plan  Periumbilical hernia  On exam she has a small fascial defect at the base of her umbilical stalk.  I reviewed her ultrasound which agrees with the fascial defect.  Discussed options and recommend proceeding with an open repair at [facility].  The defect is around 1 cm and unlikely large enough to need mesh.  I did explain the slightly increased risk of necrosis of the skin the umbilicus due to her abdominoplasty procedure.  I also spoke to family member who is a general surgeon in [place].  The risk benefits alternatives were explained to her she is agreeable to proceed    Orders:    Ambulatory Referral to General Surgery    Case request operating room: REPAIR HERNIA UMBILICAL; Standing    Case request operating room: REPAIR HERNIA UMBILICAL      History of Present Illness     Fredy Tran is a 44 y.o. female who presents for evaluation of umbilical hernia.  The official  line was used for this visit.  She complains of abdominal pain specifically around her umbilical area.  She has a history of abdominoplasty in the past.  She also has some chronic constipation issues.  She is very concerned about her lump at her umbilicus.    Review of Systems   Constitutional:  Negative for appetite change, chills and fever.   HENT:  Negative for congestion and ear pain.    Eyes:  Negative for discharge and itching.   Respiratory:  Negative for chest tightness and shortness of breath.    Cardiovascular:  Negative for chest pain and palpitations.   Gastrointestinal:  Positive for abdominal pain. Negative for abdominal distention.   Musculoskeletal:  Negative for arthralgias and gait problem.   Skin:  Negative for color change and rash.   Neurological:  Negative for dizziness  "and numbness.   Psychiatric/Behavioral:  Negative for agitation and confusion.            Objective     /70 (BP Location: Left arm, Patient Position: Sitting, Cuff Size: Adult)   Pulse 80   Temp 97.6 °F (36.4 °C) (Tympanic)   Resp 16   Ht 5' 5.5\" (1.664 m)   Wt 60.8 kg (134 lb)   LMP 07/13/2024   SpO2 99%   BMI 21.96 kg/m²     Physical Exam  Vitals and nursing note reviewed.   Constitutional:       General: She is not in acute distress.     Appearance: She is well-developed. She is not diaphoretic.   HENT:      Head: Normocephalic and atraumatic.   Eyes:      Pupils: Pupils are equal, round, and reactive to light.   Cardiovascular:      Rate and Rhythm: Normal rate and regular rhythm.   Pulmonary:      Effort: Pulmonary effort is normal. No respiratory distress.   Abdominal:      General: Bowel sounds are normal.      Palpations: Abdomen is soft.      Comments: Abdominal plasty scars.  Scar on the umbilicus.  Small fascial defect.   Musculoskeletal:         General: Normal range of motion.      Cervical back: Normal range of motion and neck supple.   Skin:     General: Skin is warm and dry.   Neurological:      Mental Status: She is alert and oriented to person, place, and time.   Psychiatric:         Behavior: Behavior normal.         "

## 2024-09-24 NOTE — ASSESSMENT & PLAN NOTE
On exam she has a small fascial defect at the base of her umbilical stalk.  I reviewed her ultrasound which agrees with the fascial defect.  Discussed options and recommend proceeding with an open repair at [facility].  The defect is around 1 cm and unlikely large enough to need mesh.  I did explain the slightly increased risk of necrosis of the skin the umbilicus due to her abdominoplasty procedure.  I also spoke to family member who is a general surgeon in [place].  The risk benefits alternatives were explained to her she is agreeable to proceed    Orders:    Ambulatory Referral to General Surgery    Case request operating room: REPAIR HERNIA UMBILICAL; Standing    Case request operating room: REPAIR HERNIA UMBILICAL

## 2024-09-24 NOTE — PROGRESS NOTES
Ambulatory Visit  Name: Fredy Tran      : 1980      MRN: [id number]  Encounter Provider: Roman Almeida MD  Encounter Date: 2024   Encounter department: [facility]    Assessment & Plan  Periumbilical hernia  On exam she has a small fascial defect at the base of her umbilical stalk.  I reviewed her ultrasound which agrees with the fascial defect.  Discussed options and recommend proceeding with an open repair at [facility].  The defect is around 1 cm and unlikely large enough to need mesh.  I did explain the slightly increased risk of necrosis of the skin the umbilicus due to her abdominoplasty procedure.  I also spoke to family member who is a general surgeon in [place].  The risk benefits alternatives were explained to her she is agreeable to proceed    Orders:    Ambulatory Referral to General Surgery    Case request operating room: REPAIR HERNIA UMBILICAL; Standing    Case request operating room: REPAIR HERNIA UMBILICAL      History of Present Illness     Fredy Tran is a 44 y.o. female who presents for evaluation of umbilical hernia.  The official  line was used for this visit.  She complains of abdominal pain specifically around her umbilical area.  She has a history of abdominoplasty in the past.  She also has some chronic constipation issues.  She is very concerned about her lump at her umbilicus.    Review of Systems   Constitutional:  Negative for appetite change, chills and fever.   HENT:  Negative for congestion and ear pain.    Eyes:  Negative for discharge and itching.   Respiratory:  Negative for chest tightness and shortness of breath.    Cardiovascular:  Negative for chest pain and palpitations.   Gastrointestinal:  Positive for abdominal pain. Negative for abdominal distention.   Musculoskeletal:  Negative for arthralgias and gait problem.   Skin:  Negative for color change and rash.   Neurological:  Negative for dizziness  "and numbness.   Psychiatric/Behavioral:  Negative for agitation and confusion.            Objective     /70 (BP Location: Left arm, Patient Position: Sitting, Cuff Size: Adult)   Pulse 80   Temp 97.6 °F (36.4 °C) (Tympanic)   Resp 16   Ht 5' 5.5\" (1.664 m)   Wt 60.8 kg (134 lb)   LMP 07/13/2024   SpO2 99%   BMI 21.96 kg/m²     Physical Exam  Vitals and nursing note reviewed.   Constitutional:       General: She is not in acute distress.     Appearance: She is well-developed. She is not diaphoretic.   HENT:      Head: Normocephalic and atraumatic.   Eyes:      Pupils: Pupils are equal, round, and reactive to light.   Cardiovascular:      Rate and Rhythm: Normal rate and regular rhythm.   Pulmonary:      Effort: Pulmonary effort is normal. No respiratory distress.   Abdominal:      General: Bowel sounds are normal.      Palpations: Abdomen is soft.      Comments: Abdominal plasty scars.  Scar on the umbilicus.  Small fascial defect.   Musculoskeletal:         General: Normal range of motion.      Cervical back: Normal range of motion and neck supple.   Skin:     General: Skin is warm and dry.   Neurological:      Mental Status: She is alert and oriented to person, place, and time.   Psychiatric:         Behavior: Behavior normal.         "

## 2024-10-04 NOTE — PRE-PROCEDURE INSTRUCTIONS
Pre-Surgery Instructions:   Medication Instructions    COLLAGEN PO Stop taking 7 days prior to surgery.    ibuprofen (MOTRIN) 400 mg tablet Stop taking 3 days prior to surgery.    levETIRAcetam (KEPPRA) 1000 MG tablet Take night before surgery    VITAMIN D, CHOLECALCIFEROL, PO Hold day of surgery.    [id number]     Medication instructions for day surgery reviewed. Please use only a sip of water to take your instructed medications. Avoid all over the counter vitamins, supplements and NSAIDS for one week prior to surgery per anesthesia guidelines. Tylenol is ok to take as needed.     You will receive a call one business day prior to surgery with an arrival time and hospital directions. If your surgery is scheduled on a Monday, the hospital will be calling you on the Friday prior to your surgery. If you have not heard from anyone by 8pm, please call the hospital supervisor through the hospital  at [phone]. ([place] [phone] or [place] [phone]).    Do not eat or drink anything after midnight the night before your surgery, including candy, mints, lifesavers, or chewing gum. Do not drink alcohol 24hrs before your surgery. Try not to smoke at least 24hrs before your surgery.       Follow the pre surgery showering instructions as listed in the “My Surgical Experience Booklet” or otherwise provided by your surgeon's office. Do not use a blade to shave the surgical area 1 week before surgery. It is okay to use a clean electric clippers up to 24 hours before surgery. Do not apply any lotions, creams, including makeup, cologne, deodorant, or perfumes after showering on the day of your surgery. Do not use dry shampoo, hair spray, hair gel, or any type of hair products.     No contact lenses, eye make-up, or artificial eyelashes. Remove nail polish, including gel polish, and any artificial, gel, or acrylic nails if possible. Remove all jewelry including rings and body piercing jewelry.     Wear  causal clothing that is easy to take on and off. Consider your type of surgery.    Keep any valuables, jewelry, piercings at home. Please bring any specially ordered equipment (sling, braces) if indicated.    Arrange for a responsible person to drive you to and from the hospital on the day of your surgery. Please confirm the visitor policy for the day of your procedure when you receive your phone call with an arrival time.     Call the surgeon's office with any new illnesses, exposures, or additional questions prior to surgery.    Please reference your “My Surgical Experience Booklet” for additional information to prepare for your upcoming surgery.

## 2024-10-07 ENCOUNTER — PATIENT OUTREACH (OUTPATIENT)
Dept: OTHER | Facility: CLINIC | Age: 44
End: 2024-10-07

## 2024-10-12 ENCOUNTER — ANESTHESIA EVENT (OUTPATIENT)
Dept: PERIOP | Facility: HOSPITAL | Age: 44
End: 2024-10-12
Payer: COMMERCIAL

## 2024-10-14 ENCOUNTER — HOSPITAL ENCOUNTER (OUTPATIENT)
Facility: HOSPITAL | Age: 44
Setting detail: OUTPATIENT SURGERY
Discharge: HOME | End: 2024-10-14
Attending: SURGERY | Admitting: SURGERY
Payer: COMMERCIAL

## 2024-10-14 ENCOUNTER — ANESTHESIA (OUTPATIENT)
Dept: PERIOP | Facility: HOSPITAL | Age: 44
End: 2024-10-14
Payer: COMMERCIAL

## 2024-10-14 VITALS
BODY MASS INDEX: 21.33 KG/M2 | HEART RATE: 79 BPM | RESPIRATION RATE: 16 BRPM | WEIGHT: 132.72 LBS | DIASTOLIC BLOOD PRESSURE: 50 MMHG | OXYGEN SATURATION: 93 % | HEIGHT: 66 IN | SYSTOLIC BLOOD PRESSURE: 106 MMHG | TEMPERATURE: 98.8 F

## 2024-10-14 DIAGNOSIS — T65.291A TOXIC EFFECT OF TOBACCO AND NICOTINE, ACCIDENTAL OR UNINTENTIONAL, INITIAL ENCOUNTER: Primary | ICD-10-CM

## 2024-10-14 DIAGNOSIS — K42.9 PERIUMBILICAL HERNIA: ICD-10-CM

## 2024-10-14 LAB
EXT PREGNANCY TEST URINE: NEGATIVE
EXT. CONTROL: NORMAL

## 2024-10-14 PROCEDURE — 49591 RPR AA HRN 1ST < 3 CM RDC: CPT | Performed by: SURGERY

## 2024-10-14 PROCEDURE — 81025 URINE PREGNANCY TEST: CPT | Performed by: ANESTHESIOLOGY

## 2024-10-14 RX ORDER — PROPOFOL 10 MG/ML
INJECTION, EMULSION INTRAVENOUS CONTINUOUS PRN
Status: DISCONTINUED | OUTPATIENT
Start: 2024-10-14 | End: 2024-10-14

## 2024-10-14 RX ORDER — ACETAMINOPHEN 325 MG/1
650 TABLET ORAL EVERY 6 HOURS PRN
Status: DISCONTINUED | OUTPATIENT
Start: 2024-10-14 | End: 2024-10-14 | Stop reason: HOSPADM

## 2024-10-14 RX ORDER — DEXAMETHASONE SODIUM PHOSPHATE 10 MG/ML
INJECTION, SOLUTION INTRAMUSCULAR; INTRAVENOUS AS NEEDED
Status: DISCONTINUED | OUTPATIENT
Start: 2024-10-14 | End: 2024-10-14

## 2024-10-14 RX ORDER — LIDOCAINE HYDROCHLORIDE 20 MG/ML
INJECTION, SOLUTION EPIDURAL; INFILTRATION; INTRACAUDAL; PERINEURAL AS NEEDED
Status: DISCONTINUED | OUTPATIENT
Start: 2024-10-14 | End: 2024-10-14

## 2024-10-14 RX ORDER — SODIUM CHLORIDE, SODIUM LACTATE, POTASSIUM CHLORIDE, CALCIUM CHLORIDE 600; 310; 30; 20 MG/100ML; MG/100ML; MG/100ML; MG/100ML
125 INJECTION, SOLUTION INTRAVENOUS CONTINUOUS
Status: DISCONTINUED | OUTPATIENT
Start: 2024-10-14 | End: 2024-10-14 | Stop reason: HOSPADM

## 2024-10-14 RX ORDER — HYDROMORPHONE HCL/PF 1 MG/ML
0.5 SYRINGE (ML) INJECTION
Status: DISCONTINUED | OUTPATIENT
Start: 2024-10-14 | End: 2024-10-14 | Stop reason: HOSPADM

## 2024-10-14 RX ORDER — OXYCODONE HYDROCHLORIDE 5 MG/1
5 TABLET ORAL EVERY 4 HOURS PRN
Qty: 12 TABLET | Refills: 0 | Status: SHIPPED | OUTPATIENT
Start: 2024-10-14 | End: 2024-10-24

## 2024-10-14 RX ORDER — VANCOMYCIN HYDROCHLORIDE 1 G/20ML
INJECTION, POWDER, LYOPHILIZED, FOR SOLUTION INTRAVENOUS AS NEEDED
Status: DISCONTINUED | OUTPATIENT
Start: 2024-10-14 | End: 2024-10-14

## 2024-10-14 RX ORDER — OXYCODONE HYDROCHLORIDE 5 MG/1
5 TABLET ORAL EVERY 4 HOURS PRN
Status: DISCONTINUED | OUTPATIENT
Start: 2024-10-14 | End: 2024-10-14 | Stop reason: HOSPADM

## 2024-10-14 RX ORDER — ACETAMINOPHEN 10 MG/ML
1000 INJECTION, SOLUTION INTRAVENOUS ONCE
Status: COMPLETED | OUTPATIENT
Start: 2024-10-14 | End: 2024-10-14

## 2024-10-14 RX ORDER — SODIUM CHLORIDE 9 MG/ML
125 INJECTION, SOLUTION INTRAVENOUS CONTINUOUS
Status: DISCONTINUED | OUTPATIENT
Start: 2024-10-14 | End: 2024-10-14 | Stop reason: HOSPADM

## 2024-10-14 RX ORDER — MAGNESIUM HYDROXIDE 1200 MG/15ML
LIQUID ORAL AS NEEDED
Status: DISCONTINUED | OUTPATIENT
Start: 2024-10-14 | End: 2024-10-14 | Stop reason: HOSPADM

## 2024-10-14 RX ORDER — ONDANSETRON 2 MG/ML
4 INJECTION INTRAMUSCULAR; INTRAVENOUS ONCE AS NEEDED
Status: DISCONTINUED | OUTPATIENT
Start: 2024-10-14 | End: 2024-10-14 | Stop reason: HOSPADM

## 2024-10-14 RX ORDER — SCOLOPAMINE TRANSDERMAL SYSTEM 1 MG/1
1 PATCH, EXTENDED RELEASE TRANSDERMAL ONCE AS NEEDED
Status: COMPLETED | OUTPATIENT
Start: 2024-10-14 | End: 2024-10-14

## 2024-10-14 RX ORDER — ROCURONIUM BROMIDE 10 MG/ML
INJECTION, SOLUTION INTRAVENOUS AS NEEDED
Status: DISCONTINUED | OUTPATIENT
Start: 2024-10-14 | End: 2024-10-14

## 2024-10-14 RX ORDER — MIDAZOLAM HYDROCHLORIDE 2 MG/2ML
INJECTION, SOLUTION INTRAMUSCULAR; INTRAVENOUS AS NEEDED
Status: DISCONTINUED | OUTPATIENT
Start: 2024-10-14 | End: 2024-10-14

## 2024-10-14 RX ORDER — BUPIVACAINE HYDROCHLORIDE AND EPINEPHRINE 2.5; 5 MG/ML; UG/ML
INJECTION, SOLUTION EPIDURAL; INFILTRATION; INTRACAUDAL; PERINEURAL AS NEEDED
Status: DISCONTINUED | OUTPATIENT
Start: 2024-10-14 | End: 2024-10-14 | Stop reason: HOSPADM

## 2024-10-14 RX ORDER — PROPOFOL 10 MG/ML
INJECTION, EMULSION INTRAVENOUS AS NEEDED
Status: DISCONTINUED | OUTPATIENT
Start: 2024-10-14 | End: 2024-10-14

## 2024-10-14 RX ORDER — FENTANYL CITRATE 50 UG/ML
INJECTION, SOLUTION INTRAMUSCULAR; INTRAVENOUS AS NEEDED
Status: DISCONTINUED | OUTPATIENT
Start: 2024-10-14 | End: 2024-10-14

## 2024-10-14 RX ORDER — ONDANSETRON 2 MG/ML
INJECTION INTRAMUSCULAR; INTRAVENOUS AS NEEDED
Status: DISCONTINUED | OUTPATIENT
Start: 2024-10-14 | End: 2024-10-14

## 2024-10-14 RX ADMIN — PROPOFOL 150 MCG/KG/MIN: 10 INJECTION, EMULSION INTRAVENOUS at 12:09

## 2024-10-14 RX ADMIN — ACETAMINOPHEN 1000 MG: 10 INJECTION INTRAVENOUS at 12:25

## 2024-10-14 RX ADMIN — ONDANSETRON 4 MG: 2 INJECTION INTRAMUSCULAR; INTRAVENOUS at 12:30

## 2024-10-14 RX ADMIN — ROCURONIUM 40 MG: 50 INJECTION, SOLUTION INTRAVENOUS at 12:09

## 2024-10-14 RX ADMIN — HYDROMORPHONE HYDROCHLORIDE 0.5 MG: 1 INJECTION, SOLUTION INTRAMUSCULAR; INTRAVENOUS; SUBCUTANEOUS at 13:05

## 2024-10-14 RX ADMIN — FENTANYL CITRATE 50 MCG: 50 INJECTION INTRAMUSCULAR; INTRAVENOUS at 12:09

## 2024-10-14 RX ADMIN — VANCOMYCIN HYDROCHLORIDE 1 G: 1 INJECTION, POWDER, LYOPHILIZED, FOR SOLUTION INTRAVENOUS at 12:20

## 2024-10-14 RX ADMIN — SCOPOLAMINE 1 PATCH: 1.5 PATCH, EXTENDED RELEASE TRANSDERMAL at 12:05

## 2024-10-14 RX ADMIN — OXYCODONE HYDROCHLORIDE 5 MG: 5 TABLET ORAL at 15:11

## 2024-10-14 RX ADMIN — SUGAMMADEX 200 MG: 100 INJECTION, SOLUTION INTRAVENOUS at 12:37

## 2024-10-14 RX ADMIN — MIDAZOLAM 2 MG: 1 INJECTION INTRAMUSCULAR; INTRAVENOUS at 12:03

## 2024-10-14 RX ADMIN — FENTANYL CITRATE 25 MCG: 50 INJECTION INTRAMUSCULAR; INTRAVENOUS at 12:48

## 2024-10-14 RX ADMIN — SODIUM CHLORIDE 125 ML/HR: 0.9 INJECTION, SOLUTION INTRAVENOUS at 11:30

## 2024-10-14 RX ADMIN — PROPOFOL 200 MG: 10 INJECTION, EMULSION INTRAVENOUS at 12:09

## 2024-10-14 RX ADMIN — DEXAMETHASONE SODIUM PHOSPHATE 10 MG: 10 INJECTION INTRAMUSCULAR; INTRAVENOUS at 12:09

## 2024-10-14 RX ADMIN — HYDROMORPHONE HYDROCHLORIDE 0.5 MG: 1 INJECTION, SOLUTION INTRAMUSCULAR; INTRAVENOUS; SUBCUTANEOUS at 13:32

## 2024-10-14 RX ADMIN — LIDOCAINE HYDROCHLORIDE 100 MG: 20 INJECTION, SOLUTION EPIDURAL; INFILTRATION; INTRACAUDAL at 12:09

## 2024-10-14 NOTE — ANESTHESIA PREPROCEDURE EVALUATION
Procedure:  REPAIR HERNIA UMBILICAL (Abdomen)    Relevant Problems   ANESTHESIA   (+) PONV (postoperative nausea and vomiting)      NEURO/PSYCH   (+) Seizures (HCC)      Other   (+) Periumbilical hernia        Physical Exam    Airway    Mallampati score: II  TM Distance: >3 FB  Neck ROM: full     Dental   No notable dental hx     Cardiovascular  Rhythm: regular, Rate: normal, Cardiovascular exam normal    Pulmonary  Pulmonary exam normal Breath sounds clear to auscultation    Other Findings  post-pubertal.      Anesthesia Plan  ASA Score- 2     Anesthesia Type- general with ASA Monitors.         Additional Monitors:     Airway Plan: ETT.    Comment: Pt hasn't had a seizure x 1 yr.  She tends to cough for days after anesthesia..       Plan Factors-    Chart reviewed.   Existing labs reviewed. Patient summary reviewed.                  Induction- intravenous.    Postoperative Plan- Plan for postoperative opioid use.         Informed Consent- Anesthetic plan and risks discussed with patient.  I personally reviewed this patient with the CRNA. Discussed and agreed on the Anesthesia Plan with the CRNA..

## 2024-10-14 NOTE — INTERVAL H&P NOTE
H&P reviewed. After examining the patient I find no changes in the patients condition since the H&P had been written.    Vitals:    10/14/24 1119   BP: 90/58   Pulse: 73   Resp: 16   Temp: 98 °F (36.7 °C)   SpO2: 97%

## 2024-10-14 NOTE — DISCHARGE INSTR - AVS FIRST PAGE
[facility]’s General Surgery [facility][1]     Post-Operative Care Instructions     Dr. Roman Almeida MD, [facility]     [phone]          1. General: You will feel pulling sensations around the wound or funny aches and pains around the incisions. This is normal. Even minor surgery is a change in your body and this is your body’s way of reacting to it. If you have had abdominal surgery, it may help to support the incision with a small pillow or blanket for comfort when moving or coughing.     2. Wound care:  Okay to shower.  The glue will fall off over the next week or 2.   Use ice for at least the 1st 48 hours.  Do not use for longer than 20 minutes at a time. Use 3 times per day.     3. Water: You may shower over the wounds. Do not bathe or use a pool or hot tub until cleared by the physician.   If you were discharged with a drain, make sure drain site is covered with plastic wrap before showering.      4. Activity: You may go up and down stairs, walk as much as you are comfortable, but walk at least 3 times each day. If you have had abdominal surgery, do not lift anything heavier than 20 pounds for at least 4 weeks.      5. Diet: You may resume a regular diet. If you had a same-day surgery or overnight stay surgery, you may wish to eat lightly for a few days: soups, crackers, and sandwiches. You may resume a regular diet when ready.      6. Medications: Resume all of your previous medications, unless told otherwise by the doctor. Avoid aspirin products for 2-3 days after the date of surgery. You may, at that time, began to take them again. Use Tylenol and Ibuprofen for pain control.  You may alternate these medications every 3 hours.  For example: you may take Tylenol at noon, Ibuprofen at 3:00 p.m., and Tylenol again at 6:00 p.m., etc. You should use ice to assist with pain control as above.  You do not need to take narcotic pain medication unless you are having significant pain.   If you were prescribed a  narcotic pain medication containing Tylenol, such as Percocet or Norco, do not use supplemental Tylenol.      7. Driving: You will need someone to drive you home on the day of surgery or discharge. Do not drive or make any important decisions while on narcotic pain medication or 24 hours and after anesthesia or sedation for surgery. Generally, you may drive when your off all narcotic pain medications and you are comfortable.      8. Upset Stomach: You may take Maalox, Tums, or similar items for an upset stomach. If your narcotic pain medication causes an upset stomach, do not take it on an empty stomach. Try taking it with at least some crackers or toast.      9. Constipation: Patients often experience constipation after surgery. You may take over-the-counter medication for this, such as Metamucil, Senokot, Dulcolax, milk of magnesia, etc. You may take a suppository unless you have had anorectal surgery such as a procedure on your hemorrhoids. If you experience significant nausea or vomiting after abdominal surgery, call the office before trying any of these medications.     10. Call the office: If you are experiencing any of the following: fevers above 101.5°, significant nausea or vomiting, if the wound develops drainage and/or there is excessive redness around the wound, or if you have significant diarrhea or other worsening symptoms.     11. Pain: You may be given a prescription for pain medication.  This will be sent to your pharmacy prior to discharge.     12. Sexual Activity: You may resume sexual activity when you feel ready and comfortable and your incision is sealed and healed without apparent infection risk.     13. Urination: If you have not urinated in 6 hours, go directly to the ER for evaluation for urinary retention.      14. Follow-up in 2 weeks.          **READ ONLY IF YOU HAVE BEEN DISCHARGED WITH A URINARY CATHETER**    [place]no Insertion for Post-Op Urinary Retention        - A prescription for  Flomax will be sent to your pharmacy.  This should be taken daily while the urinary catheter remains in place.    You will not be given a prescription for Flomax if your prostate has been removed.  If you are already taking Flomax, continue the medication as prescribed.     - We will send a message to the urology group who will contact you within the next 48 hours with further instructions and to schedule an appointment for voiding trial and catheter removal.  The urinary catheter will remain in place for approximately 1 week.  If you are not contacted within the next 48 hours please call our office to assist with scheduling your follow-up.     - If you have your own urologist, you should contact your physician the day after discharge for instructions and to schedule a voiding trial and catheter removal.

## 2024-10-14 NOTE — ANESTHESIA POSTPROCEDURE EVALUATION
Post-Op Assessment Note    CV Status:  Stable    Pain management: adequate       Mental Status:  Alert and awake   Hydration Status:  Euvolemic   PONV Controlled:  Controlled   Airway Patency:  Patent     Post Op Vitals Reviewed: Yes    No anethesia notable event occurred.    Staff: Anesthesiologist           Last Filed PACU Vitals:  Vitals Value Taken Time   Temp 97.6 °F (36.4 °C) 10/14/24 1249   Pulse 74 10/14/24 1425   BP 94/53 10/14/24 1422   Resp 17 10/14/24 1425   SpO2 96 % 10/14/24 1424   Vitals shown include unfiled device data.    Modified Suni:  Activity: 2 (10/14/2024  2:19 PM)  Respiration: 2 (10/14/2024  2:19 PM)  Circulation: 2 (10/14/2024  2:19 PM)  Consciousness: 2 (10/14/2024  2:19 PM)  Oxygen Saturation: 2 (10/14/2024  2:19 PM)  Modified Suni Score: 10 (10/14/2024  2:19 PM)

## 2024-10-14 NOTE — OP NOTE
OPERATIVE REPORT  PATIENT NAME: Fredy rTan    :  1980  MRN: [id number]  Pt Location: [facility] OR ROOM 03    SURGERY DATE: 10/14/2024    Surgeons and Role:     * Roman Almeida MD - Primary     * Georges Tipton MD - Assisting    Preop Diagnosis:  Periumbilical hernia [K42.9]    Post-Op Diagnosis Codes:     * Periumbilical hernia [K42.9]    Procedure(s):  REPAIR HERNIA UMBILICAL    Specimen(s):  * No specimens in log *    Estimated Blood Loss:   Minimal    Drains:  * No LDAs found *    Anesthesia Type:   General    Operative Indications:  Periumbilical hernia [K42.9]      Operative Findings:    Prior to opening the fascia, or reducing the contents of the hernia, the hernia defect was measured. The defect measured 0.8 cm by 0.8 cm. This was repaired as described in the body of the report below..  Previous Prolene suture removed slightly lateral to the defect.    Complications:   None    Procedure and Technique:  The patient was seen in the Holding Room. The risks, benefits, complications, treatment options, and expected outcomes were discussed with the patient. The possibilities of reaction to medication, pulmonary aspiration, perforation of viscus, bleeding, recurrent infection, the need for additional procedures, failure to diagnose a condition, and creating a complication requiring transfusion or operation were discussed with the patient. The patient concurred with the proposed plan, giving informed consent.  The site of surgery properly noted/marked. The patient was taken to Operating Room, identified as Fredy Tran and staff verified patient name, , and procedure.   A Time Out was held and the above information confirmed.    The patient was placed supine.  After establishing general anesthesia, the abdomen was prepped and draped in standard fashion. Local anesthesia was used at the incision. An infraumbilical incision was made.  Dissection was carried down to the hernia sac located above the fascia and  was mobilized from surrounding structures.  The defect was quite small and deemed inappropriate for mesh placement.  Intact fascia was identified circumferentially around the defect. Adhesions anterior and posterior to the fascia were lysed using cautery and/or blunt dissection. The Fascia was closed with interrupted 2-0 Prolene sutures.  The umbilicus was re-created using 3-0 Vicryl sutures.The soft tissue was irrigated and closed in layers,using Vicryl sutures.  Hemostasis was confirmed.  The skin incision was closed in layers with a 4-0 Monocryl subcuticular closure.  Histocryl glue was used.  Instrument, sponge, and needle counts were correct prior to closure and at the conclusion of the case.     This text is generated with voice recognition software. There may be translation, syntax,  or grammatical errors. If you have any questions, please contact the dictating provider.      I was present for the entire procedure.    Patient Disposition:  PACU              SIGNATURE: Roman Almeida MD  DATE: October 14, 2024  TIME: 12:39 PM

## 2024-10-15 ENCOUNTER — NURSE TRIAGE (OUTPATIENT)
Age: 44
End: 2024-10-15

## 2024-10-15 NOTE — TELEPHONE ENCOUNTER
"Pt of Dr. Almeida s/p umbilical hernia repair yesterday-   Pt sister calling in reporting pt has some swelling in bilateral hands, legs, ankles, and feet and was concerned about water retention.   Sister says pt is urinating every few hours and doesn't feel like she has urine retention as she does not have the urge to go more.   Denies fever, pain.     Sister says her mother takes lasix for water retention which has caused her to consider this need for pt.     Advised that some swelling is typical especially the day of and after surgery and that slowly it should decrease with walking and healing.    Would like advice from provider.   Please advise.      Reason for Disposition   [1] MILD swelling of both ankles (e.g., mild pedal edema) AND [2] caused by hot weather    Answer Assessment - Initial Assessment Questions  1. LOCATION: \"Which ankle is swollen?\" \"Where is the swelling?\"      Both ankles, and all extremities feel swollen.   2. ONSET: \"When did the swelling start?\"      Yesterday post op   3. SWELLING: \"How bad is the swelling?\" Or, \"How large is it?\" (e.g., mild, moderate, severe; size of localized swelling)       Mild   4. PAIN: \"Is there any pain?\" If Yes, ask: \"How bad is it?\" (Scale 0-10; or none, mild, moderate, severe)      Denies pain   5. CAUSE: \"What do you think caused the ankle swelling?\"      \"From all the fluids during surgery\"   6. OTHER SYMPTOMS: \"Do you have any other symptoms?\" (e.g., fever, chest pain, difficulty breathing, calf pain)      Denies   7. PREGNANCY: \"Is there any chance you are pregnant?\" \"When was your last menstrual period?\"      Denies    Protocols used: Ankle Swelling-Adult-AH    "

## 2024-10-30 ENCOUNTER — OFFICE VISIT (OUTPATIENT)
Dept: SURGERY | Facility: CLINIC | Age: 44
End: 2024-10-30
Payer: COMMERCIAL

## 2024-10-30 VITALS
OXYGEN SATURATION: 97 % | SYSTOLIC BLOOD PRESSURE: 97 MMHG | BODY MASS INDEX: 22.05 KG/M2 | HEART RATE: 92 BPM | TEMPERATURE: 97.6 F | WEIGHT: 137.2 LBS | HEIGHT: 66 IN | DIASTOLIC BLOOD PRESSURE: 82 MMHG

## 2024-10-30 DIAGNOSIS — Z09 STATUS POST UMBILICAL HERNIA REPAIR, FOLLOW-UP EXAM: Primary | ICD-10-CM

## 2024-10-30 PROCEDURE — 99212 OFFICE O/P EST SF 10 MIN: CPT | Performed by: SURGERY

## 2024-10-30 NOTE — PROGRESS NOTES
"Ambulatory Visit  Name: Fredy Tran      : 1980      MRN: [id number]  Encounter Provider: Roman Almeida MD  Encounter Date: 10/30/2024   Encounter department: [facility] [place]    Assessment & Plan  Status post umbilical hernia repair, follow-up exam  It has been 2 weeks to surgery.  Overall doing well.  The glue was removed and the incision is healing well.  In regards to constipation I recommend continue with MiraLAX or fiber.  Okay to add and other laxatives if needed.  She has 2 more weeks of restrictions and can follow-up as needed           History of Present Illness     Fredy Tran is a 44 y.o. female who presents for evaluation of umbilical hernia.  The official  line was used for this visit.  She complains of abdominal pain specifically around her umbilical area.  She has a history of abdominoplasty in the past.  She also has some chronic constipation issues.  She is very concerned about her lump at her umbilicus.    10/30/2024 she is now 2-week since her surgery.  Overall doing pretty well.  Having constipation issues which are chronic.    Review of Systems   Constitutional:  Negative for appetite change, chills and fever.   HENT:  Negative for congestion and ear pain.    Eyes:  Negative for discharge and itching.   Respiratory:  Negative for chest tightness and shortness of breath.    Cardiovascular:  Negative for chest pain and palpitations.   Gastrointestinal:  Positive for abdominal pain and constipation. Negative for abdominal distention.   Musculoskeletal:  Negative for arthralgias and gait problem.   Skin:  Negative for color change and rash.   Neurological:  Negative for dizziness and numbness.   Psychiatric/Behavioral:  Negative for agitation and confusion.            Objective     BP 97/82 (BP Location: Left arm, Patient Position: Sitting, Cuff Size: Standard)   Pulse 92   Temp 97.6 °F (36.4 °C) (Tympanic)   Ht 5' 5.5\" (1.664 m)   Wt 62.2 kg (137 " lb 3.2 oz)   SpO2 97%   BMI 22.48 kg/m²     Physical Exam  Vitals and nursing note reviewed.   Constitutional:       General: She is not in acute distress.     Appearance: She is well-developed. She is not diaphoretic.   HENT:      Head: Normocephalic and atraumatic.   Eyes:      Pupils: Pupils are equal, round, and reactive to light.   Cardiovascular:      Rate and Rhythm: Normal rate and regular rhythm.   Pulmonary:      Effort: Pulmonary effort is normal. No respiratory distress.   Abdominal:      General: Bowel sounds are normal.      Palpations: Abdomen is soft.      Comments: Incision healing well.   Musculoskeletal:         General: Normal range of motion.      Cervical back: Normal range of motion and neck supple.   Skin:     General: Skin is warm and dry.   Neurological:      Mental Status: She is alert and oriented to person, place, and time.   Psychiatric:         Behavior: Behavior normal.

## 2024-10-30 NOTE — ASSESSMENT & PLAN NOTE
It has been 2 weeks to surgery.  Overall doing well.  The glue was removed and the incision is healing well.  In regards to constipation I recommend continue with MiraLAX or fiber.  Okay to add and other laxatives if needed.  She has 2 more weeks of restrictions and can follow-up as needed

## 2024-11-13 PROCEDURE — 88175 CYTOPATH C/V AUTO FLUID REDO: CPT | Performed by: FAMILY MEDICINE

## 2024-11-14 ENCOUNTER — LAB REQUISITION (OUTPATIENT)
Dept: LAB | Facility: HOSPITAL | Age: 44
End: 2024-11-14
Payer: COMMERCIAL

## 2024-11-14 DIAGNOSIS — Z01.419 ENCOUNTER FOR GYNECOLOGICAL EXAMINATION (GENERAL) (ROUTINE) WITHOUT ABNORMAL FINDINGS: ICD-10-CM

## 2024-11-18 LAB
LAB AP GYN PRIMARY INTERPRETATION: NORMAL
LAB AP LMP: NORMAL
Lab: NORMAL

## 2024-11-26 ENCOUNTER — TELEPHONE (OUTPATIENT)
Age: 44
End: 2024-11-26

## 2024-11-27 ENCOUNTER — OFFICE VISIT (OUTPATIENT)
Dept: URGENT CARE | Age: 44
End: 2024-11-27
Payer: COMMERCIAL

## 2024-11-27 ENCOUNTER — TELEPHONE (OUTPATIENT)
Age: 44
End: 2024-11-27

## 2024-11-27 VITALS
TEMPERATURE: 97.5 F | RESPIRATION RATE: 16 BRPM | HEART RATE: 90 BPM | HEIGHT: 65 IN | SYSTOLIC BLOOD PRESSURE: 108 MMHG | BODY MASS INDEX: 22.49 KG/M2 | DIASTOLIC BLOOD PRESSURE: 63 MMHG | WEIGHT: 135 LBS | OXYGEN SATURATION: 98 %

## 2024-11-27 DIAGNOSIS — Z48.89 ENCOUNTER FOR EXAMINATION OF SURGICAL SITE: Primary | ICD-10-CM

## 2024-11-27 PROCEDURE — 99213 OFFICE O/P EST LOW 20 MIN: CPT

## 2024-11-27 NOTE — PROGRESS NOTES
"  [facility]        NAME: Fredy Tran is a 44 y.o. female  : 1980    MRN: [id number]  DATE: 2024  TIME: 4:15 PM    Assessment and Plan   Encounter for examination of surgical site [Z48.89]  1. Encounter for examination of surgical site          I personally contacted patient's general surgeon, Dr. Roman Osborn, through epic secure chat.  Discussed with patient's surgeon reasons as to why patient presented to the urgent care.  A picture was sent via [employer] to show surgical site.  Dr. Almeida recommended patient to utilize silver wound gel, covering the wound, and changing dressings daily.  Dr. Almeida noted to have patient be seen in his office if symptoms persist.  I informed patient of these instructions and all patient's questions answered at this time.    Patient Instructions     Apply silver wound gel (silvasorb) to opening of wound, cover wound and change dressings daily.  Follow-up with general surgery  Follow up with PCP in 3-5 days.  Proceed to  ER if symptoms worsen.    If tests are performed, our office will contact you with results only if changes need to made to the care plan discussed with you at the visit. You can review your full results on [facility].    Chief Complaint     Chief Complaint   Patient presents with    Wound Check     Onset 24 Patient states she had a procedure done and when she removed her bandage she states her wound is now open.          History of Present Illness       Patient is a 44-year-old female with no significant PMH presenting in the clinic today for examination of surgical site.  Patient had umbilical hernia repair completed on 10/14/2024.  Patient was seen in general surgery clinic 2 weeks postop for surgical site check, follow-up, and to \"take the glue off \".  Patient states she was then instructed to keep a bandage on for 6 weeks.  Patient states over the past few days noticing some discharge to the lower " inferior aspect of the umbilicus.  Patient notes wound opening along the inferior umbilicus.  Patient notes recent white discharge from surgical site.  Denies purulent drainage, surrounding erythema, surrounding swelling, induration, warmth, bruising, fever, chills, chest pain, SOB, abdominal pain, n/v/d/c.  Admits to use of an OTC wound cleanser for symptom management.  Patient has appointment scheduled with general surgery on 12/2/2024.        Review of Systems   Review of Systems   Constitutional:  Negative for chills and fever.   Respiratory:  Negative for shortness of breath.    Cardiovascular:  Negative for chest pain.   Gastrointestinal:  Negative for abdominal pain, diarrhea, nausea and vomiting.   Neurological:  Negative for numbness.         Current Medications       Current Outpatient Medications:     azelastine (ASTELIN) 0.1 % nasal spray, 1 spray into each nostril 2 (two) times a day Use in each nostril as directed, Disp: 30 mL, Rfl: 1    butalbital-acetaminophen-caffeine-codeine (FIORICET WITH CODEINE) -14-30 MG per capsule, Take 1 capsule by mouth every 4 (four) hours as needed for headaches, Disp: 30 capsule, Rfl: 0    COLLAGEN PO, Take by mouth (Patient not taking: Reported on 10/30/2024), Disp: , Rfl:     hydrocortisone (ANUSOL-HC) 2.5 % rectal cream, Apply topically 2 (two) times a day (Patient not taking: Reported on 10/30/2024), Disp: 28 g, Rfl: 0     MG tablet, TAKE 1 TABLET BY MOUTH THREE TIMES A DAY AS NEEDED PAIN, Disp: , Rfl:     ibuprofen (MOTRIN) 400 mg tablet, TAKE 1 TABLET (400 MG TOTAL) BY MOUTH 3 (THREE) TIMES A DAY WITH MEALS FOR 10 DAYS, Disp: 60 tablet, Rfl: 0    levETIRAcetam (KEPPRA) 1000 MG tablet, Take 1 tablet (1,000 mg total) by mouth daily (Patient taking differently: Take 1,000 mg by mouth every evening), Disp: 90 tablet, Rfl: 3    levETIRAcetam (KEPPRA) 500 mg tablet, Take 500 mg by mouth 2 (two) times a day, Disp: , Rfl:     polyethylene glycol (GLYCOLAX) 17  "GM/SCOOP powder, Take 17 g by mouth daily Take as directed as per written office instructions, Disp: 510 g, Rfl: 3    Ubrogepant (UBRELVY) 100 MG tablet, Take 1 tablet (100 mg) one time as needed for migraine. May repeat one additional tablet (100 mg) at least two hours after the first dose. Do not use more than two doses per day, or for more than eight days per month., Disp: 10 tablet, Rfl: 0    Varenicline Tartrate, Starter, 0.5 MG X 11 & 1 MG X 42 TBPK, 0.5 MG ONCE DAILY FOR 3 DAYS THEN 0.5MG TWICE DAILY FOR DAYS 4-7 THEN 1 MG TWICE DAILY, Disp: 53 each, Rfl: 0    VITAMIN D, CHOLECALCIFEROL, PO, Take by mouth, Disp: , Rfl:     Current Allergies     Allergies as of 11/27/2024 - Reviewed 11/27/2024   Allergen Reaction Noted    Ceftriaxone Anaphylaxis 08/29/2023    Other Anaphylaxis 08/29/2023    Iv contrast [iodinated contrast media] Other (See Comments) 04/30/2024            The following portions of the patient's history were reviewed and updated as appropriate: allergies, current medications, past family history, past medical history, past social history, past surgical history and problem list.     Past Medical History:   Diagnosis Date    Constipation     Hemorrhoids     History of transfusion     PONV (postoperative nausea and vomiting)     Seizures (HCC)        Past Surgical History:   Procedure Laterality Date    ABDOMINAL SURGERY      tummy tuck    COLONOSCOPY  2020    NH RPR AA HERNIA 1ST < 3 CM REDUCIBLE N/A 10/14/2024    Procedure: REPAIR HERNIA UMBILICAL;  Surgeon: Roman Almeida MD;  Location: [facility];  Service: General    UPPER GASTROINTESTINAL ENDOSCOPY  2020       No family history on file.      Medications have been verified.        Objective   /63   Pulse 90   Temp 97.5 °F (36.4 °C)   Resp 16   Ht 5' 5\" (1.651 m)   Wt 61.2 kg (135 lb)   LMP 11/01/2024   SpO2 98%   BMI 22.47 kg/m²        Physical Exam     Physical Exam  Vitals reviewed.   Constitutional:       General: She is not " in acute distress.     Appearance: Normal appearance. She is normal weight. She is not ill-appearing.   HENT:      Head: Normocephalic.      Nose: Nose normal.      Mouth/Throat:      Mouth: Mucous membranes are moist.   Eyes:      Conjunctiva/sclera: Conjunctivae normal.   Cardiovascular:      Rate and Rhythm: Normal rate and regular rhythm.      Pulses: Normal pulses.      Heart sounds: Normal heart sounds. No murmur heard.     No friction rub. No gallop.   Pulmonary:      Effort: Pulmonary effort is normal.      Breath sounds: Normal breath sounds. No wheezing, rhonchi or rales.   Abdominal:      Comments: Surgical incision noted along the umbilicus.  Small opening noted at 6 oclock at the umbilicus.  Scant white discharge noted.  No surrounding swelling, erythema, warmth, induration, or tenderness noted.  No sutures noted.   Musculoskeletal:      Cervical back: Normal range of motion and neck supple.   Skin:     General: Skin is warm.      Findings: No rash.   Neurological:      Mental Status: She is alert.   Psychiatric:         Mood and Affect: Mood normal.         Behavior: Behavior normal.

## 2024-11-27 NOTE — TELEPHONE ENCOUNTER
Pt of Dr. Almeida s/p umbilical hernia repair 10/14/24-  Pt sister calling in to report pt noticed blue sutures and scant white discharge in umbilicus. Sister reports bandage has been on umbilicus since post op appointment 10/30/24.  Advised that sutures should be removed in about 2 weeks after surgery and she should be seen at urgent care for removal.   Sister would like Dr. Almeida to see pt.   Called office and offered pt appointment Monday at 11:15am.   Advised pt go to Urgent care for removal of sutures/assess for infection today and follow up on Monday with scheduled appointment.   Sister agreeable to plan.

## 2024-11-27 NOTE — PATIENT INSTRUCTIONS
Apply silver wound gel (silvasorb) to opening of wound, cover wound and change dressings daily.  Follow-up with general surgery  Follow up with PCP in 3-5 days.  Proceed to  ER if symptoms worsen

## 2024-12-02 ENCOUNTER — OFFICE VISIT (OUTPATIENT)
Dept: SURGERY | Facility: CLINIC | Age: 44
End: 2024-12-02
Payer: COMMERCIAL

## 2024-12-02 VITALS
BODY MASS INDEX: 22.66 KG/M2 | SYSTOLIC BLOOD PRESSURE: 102 MMHG | TEMPERATURE: 98 F | WEIGHT: 136 LBS | RESPIRATION RATE: 16 BRPM | HEIGHT: 65 IN | DIASTOLIC BLOOD PRESSURE: 68 MMHG | OXYGEN SATURATION: 98 % | HEART RATE: 88 BPM

## 2024-12-02 DIAGNOSIS — Z51.89 VISIT FOR WOUND CHECK: Primary | ICD-10-CM

## 2024-12-02 PROCEDURE — 99211 OFF/OP EST MAY X REQ PHY/QHP: CPT | Performed by: PHYSICIAN ASSISTANT

## 2024-12-02 NOTE — PROGRESS NOTES
Ambulatory Visit  Name: Fredy Tran      : 1980      MRN: [id number]  Encounter Provider: Anahi Owens PA-C  Encounter Date: 2024   Encounter department: [facility] [place]    Assessment & Plan  Visit for wound check  Incision looks clean with no signs of infection, small area that is healing well but not fully closed. Patient probably developed a small seroma which drained. There could be a stitch that is bothering her. We will have her return in 3 weeks to ensure complete resolution. If this becomes a chronic issue we may have to cut stitch down. Patient also needs to stop smoking.           History of Present Illness     Fredy Tran is a 44 y.o. female who presents for evaluation of umbilical hernia.    he complains of abdominal pain specifically around her umbilical area.  She has a history of abdominoplasty in the past.  She also has some chronic constipation issues.  She is very concerned about her lump at her umbilicus.    10/30/2024 she is now 2-week since her surgery.  Overall doing pretty well.  Having constipation issues which are chronic.    2024: Patient returns today with concern of her umbilical incision. She had drainage from the incision about two weeks ago and was seen at urgent care 24. A picture was sent via epic secure chat to show surgical site.  Dr. Almeida recommended patient to utilize silver wound gel, covering the wound, and changing dressings daily.  Dr. Almeida noted to have patient be seen in his office if symptoms persist. Patient states her uncle who is a surgeon in [place] sent her Augmentin to take which she states she has been taking. She states she has been using silvadene daily. Noted patient does smell of cigarette smoke. Patient also notes she saw a blue stitch.    Review of Systems   Constitutional:  Negative for appetite change, chills and fever.   HENT:  Negative for congestion and ear pain.    Eyes:  Negative for  "discharge and itching.   Respiratory:  Negative for chest tightness and shortness of breath.    Cardiovascular:  Negative for chest pain and palpitations.   Gastrointestinal:  Positive for abdominal pain and constipation. Negative for abdominal distention.   Musculoskeletal:  Negative for arthralgias and gait problem.   Skin:  Negative for color change and rash.   Neurological:  Negative for dizziness and numbness.   Psychiatric/Behavioral:  Negative for agitation and confusion.            Objective     /68 (BP Location: Left arm, Patient Position: Sitting, Cuff Size: Adult)   Pulse 88   Temp 98 °F (36.7 °C) (Tympanic)   Resp 16   Ht 5' 5\" (1.651 m)   Wt 61.7 kg (136 lb)   LMP 11/01/2024   SpO2 98%   BMI 22.63 kg/m²     Physical Exam  Vitals and nursing note reviewed.   Constitutional:       General: She is not in acute distress.     Appearance: She is well-developed. She is not diaphoretic.   HENT:      Head: Normocephalic and atraumatic.   Eyes:      Pupils: Pupils are equal, round, and reactive to light.   Cardiovascular:      Rate and Rhythm: Normal rate and regular rhythm.   Pulmonary:      Effort: Pulmonary effort is normal. No respiratory distress.   Abdominal:      General: Bowel sounds are normal.      Palpations: Abdomen is soft.      Comments: Incision healing well, small pinhole with no depth at bottom of the incision which looks to be healing well. No stitch felt on examination.   Musculoskeletal:         General: Normal range of motion.      Cervical back: Normal range of motion and neck supple.   Skin:     General: Skin is warm and dry.   Neurological:      Mental Status: She is alert and oriented to person, place, and time.   Psychiatric:         Behavior: Behavior normal.         "

## 2024-12-31 ENCOUNTER — TELEPHONE (OUTPATIENT)
Age: 44
End: 2024-12-31

## 2024-12-31 NOTE — TELEPHONE ENCOUNTER
Pt called in to r/s appt. Pt missed today's appt with Dr. Almeida and needs to r/s. Pt was told the next available is 01/29. Pt refused it explaining that needs to be seen as soon as possible because the wound is open. Attempted to call office and no answer. Pt was told her case will be escalated to the main practice.

## 2025-01-02 ENCOUNTER — OFFICE VISIT (OUTPATIENT)
Dept: SURGERY | Facility: CLINIC | Age: 45
End: 2025-01-02
Payer: COMMERCIAL

## 2025-01-02 VITALS
HEART RATE: 84 BPM | DIASTOLIC BLOOD PRESSURE: 68 MMHG | RESPIRATION RATE: 16 BRPM | SYSTOLIC BLOOD PRESSURE: 106 MMHG | HEIGHT: 66 IN | WEIGHT: 137 LBS | TEMPERATURE: 98 F | OXYGEN SATURATION: 98 % | BODY MASS INDEX: 22.02 KG/M2

## 2025-01-02 DIAGNOSIS — Z51.89 VISIT FOR WOUND CHECK: Primary | ICD-10-CM

## 2025-01-02 PROBLEM — Q28.2 AVM (ARTERIOVENOUS MALFORMATION) BRAIN: Status: ACTIVE | Noted: 2024-01-08

## 2025-01-02 PROBLEM — D32.9 MENINGIOMA (HCC): Status: ACTIVE | Noted: 2024-05-20

## 2025-01-02 PROCEDURE — 99211 OFF/OP EST MAY X REQ PHY/QHP: CPT

## 2025-01-02 NOTE — PROGRESS NOTES
Ambulatory Visit  Name: Fredy Tran      : 1980      MRN: [id number]  Encounter Provider: ROSITA hCino  Encounter Date: 2025   Encounter department: [facility] GENERAL SURGERY [place]    Assessment & Plan  Visit for wound check         Umbilical incision site appears clean, well approximated. Completely closed. No redness, drainage or no signs of infection. Patient still stating she has pain at times in this area pulling towards her right abdomen. Reassured her this is most likely the stitch she is feeling from her hernia repair. Discussed with her that if she wanted this stitch removed, this could cause her hernia to reoccur. Stressed the importance of smoking cessation. Instructed patient at this time no follow up is necessary and if it opens up again or any other issues, to contact the office for a wound check.         History of Present Illness     Fredy Tran is a 44 y.o. female who presents for evaluation of umbilical hernia.    he complains of abdominal pain specifically around her umbilical area.  She has a history of abdominoplasty in the past.  She also has some chronic constipation issues.  She is very concerned about her lump at her umbilicus.    10/30/2024 she is now 2-week since her surgery.  Overall doing pretty well.  Having constipation issues which are chronic.    24. A picture was sent via epic secure chat to show surgical site.  Dr. Almeida recommended patient to utilize silver wound gel, covering the wound, and changing dressings daily.  Dr. Almeida noted to have patient be seen in his office if symptoms persist. Patient states her uncle who is a surgeon in [place] sent her Augmentin to take which she states she has been taking. She states she has been using silvadene daily. Noted patient does smell of cigarette smoke. Patient also notes she saw a blue stitch.    2024: Patient returns today with concern of her umbilical incision. She had drainage  "from the incision about two weeks ago and was seen at urgent care     01/02/2025: Patient returns today regarding her umbilical incision. States that she went to her PCP because her incision started opening up again. PCP put her on oral antibiotic and antibiotic cream. Finished antibiotics. No drainage, redness. Slight pain pulling from umbilicus towards right side of abdomen.     Review of Systems   Constitutional:  Negative for appetite change, chills and fever.   HENT:  Negative for congestion and ear pain.    Eyes:  Negative for discharge and itching.   Respiratory:  Negative for chest tightness and shortness of breath.    Cardiovascular:  Negative for chest pain and palpitations.   Gastrointestinal:  Positive for abdominal pain. Negative for abdominal distention.   Musculoskeletal:  Negative for arthralgias and gait problem.   Skin:  Negative for color change and rash.   Neurological:  Negative for dizziness and numbness.   Psychiatric/Behavioral:  Negative for agitation and confusion.            Objective     /68 (BP Location: Left arm, Patient Position: Sitting, Cuff Size: Adult)   Pulse 84   Temp 98 °F (36.7 °C) (Tympanic)   Resp 16   Ht 5' 5.5\" (1.664 m)   Wt 62.1 kg (137 lb)   SpO2 98%   BMI 22.45 kg/m²     Physical Exam  Vitals and nursing note reviewed.   Constitutional:       General: She is not in acute distress.     Appearance: She is well-developed. She is not diaphoretic.   HENT:      Head: Normocephalic and atraumatic.   Eyes:      Pupils: Pupils are equal, round, and reactive to light.   Pulmonary:      Effort: Pulmonary effort is normal. No respiratory distress.   Abdominal:      General: Bowel sounds are normal.      Palpations: Abdomen is soft.      Hernia: No hernia is present.      Comments: Incision healed. Well approximated. No redness, swelling, or drainage. No stitch felt on examination.   Musculoskeletal:         General: Normal range of motion.      Cervical back: Normal " range of motion and neck supple.   Skin:     General: Skin is warm and dry.      Findings: No rash.   Neurological:      Mental Status: She is alert and oriented to person, place, and time.   Psychiatric:         Behavior: Behavior normal.

## 2025-02-19 ENCOUNTER — OFFICE VISIT (OUTPATIENT)
Dept: SURGERY | Facility: CLINIC | Age: 45
End: 2025-02-19
Payer: COMMERCIAL

## 2025-02-19 VITALS
OXYGEN SATURATION: 97 % | TEMPERATURE: 99 F | HEIGHT: 65 IN | WEIGHT: 132 LBS | BODY MASS INDEX: 21.99 KG/M2 | HEART RATE: 95 BPM

## 2025-02-19 DIAGNOSIS — K42.9 PERIUMBILICAL HERNIA: Primary | ICD-10-CM

## 2025-02-19 DIAGNOSIS — R10.33 PERIUMBILICAL PAIN: ICD-10-CM

## 2025-02-19 DIAGNOSIS — L76.82 INCISIONAL PAIN: Primary | ICD-10-CM

## 2025-02-19 PROCEDURE — 99214 OFFICE O/P EST MOD 30 MIN: CPT | Performed by: SPECIALIST

## 2025-02-21 ENCOUNTER — HOSPITAL ENCOUNTER (OUTPATIENT)
Dept: ULTRASOUND IMAGING | Facility: HOSPITAL | Age: 45
Discharge: HOME | End: 2025-02-21
Attending: SPECIALIST
Payer: COMMERCIAL

## 2025-02-21 DIAGNOSIS — K42.9 PERIUMBILICAL HERNIA: ICD-10-CM

## 2025-02-21 PROCEDURE — 76705 ECHO EXAM OF ABDOMEN: CPT

## 2025-02-26 ENCOUNTER — TELEPHONE (OUTPATIENT)
Age: 45
End: 2025-02-26

## 2025-02-26 NOTE — TELEPHONE ENCOUNTER
Patient of . Had US of abdomen done on 2/21/25 to evaluate for possible hernia. Patient calling to discuss results of test with surgeon.  Her CB# is [phone]

## 2025-03-05 ENCOUNTER — OFFICE VISIT (OUTPATIENT)
Dept: SURGERY | Facility: CLINIC | Age: 45
End: 2025-03-05
Payer: COMMERCIAL

## 2025-03-05 VITALS
HEART RATE: 94 BPM | BODY MASS INDEX: 21.99 KG/M2 | OXYGEN SATURATION: 96 % | WEIGHT: 132 LBS | TEMPERATURE: 97.8 F | HEIGHT: 65 IN

## 2025-03-05 DIAGNOSIS — E04.1 THYROID NODULE: ICD-10-CM

## 2025-03-05 DIAGNOSIS — E04.1 THYROID NODULE: Primary | ICD-10-CM

## 2025-03-05 DIAGNOSIS — R10.33 PERIUMBILICAL PAIN: Primary | ICD-10-CM

## 2025-03-05 PROCEDURE — 99214 OFFICE O/P EST MOD 30 MIN: CPT | Performed by: SPECIALIST

## 2025-03-06 NOTE — PROGRESS NOTES
Patient presents today for follow-up visit in regards to an ultrasound of her umbilicus.    She had previously undergone a repair of umbilical hernia.  She has some wound issues postop that apparently healed.  Unfortunately she has a persistent discomfort around the umbilicus primarily on the right side.  Also a small bulge below the umbilicus.    She was seen in the office there was no evidence of infection.  There was no evidence of recurrent hernia.  Questionable small diastases just below the umbilicus.    She wanted us to order an ultrasound of the area to verify any problems.  We obliged and did this.  The ultrasound was essentially negative.    She is informed of this.  She accepts this.    She is informed that she has persistent periumbilical pain may be from scar tissue or from a neuroma.  We discussed possibly injecting her in the area with steroids and Marcaine.  She says she cannot take steroids and this would be out of the question.  So be it.    She would like steroid and ultrasound of her thyroid as she is due for it and of course we will oblige her.

## 2025-04-23 ENCOUNTER — OFFICE VISIT (OUTPATIENT)
Dept: SURGERY | Facility: CLINIC | Age: 45
End: 2025-04-23
Payer: COMMERCIAL

## 2025-04-23 VITALS
WEIGHT: 140 LBS | TEMPERATURE: 98 F | OXYGEN SATURATION: 98 % | BODY MASS INDEX: 23.32 KG/M2 | HEIGHT: 65 IN | HEART RATE: 98 BPM | RESPIRATION RATE: 16 BRPM | DIASTOLIC BLOOD PRESSURE: 64 MMHG | SYSTOLIC BLOOD PRESSURE: 118 MMHG

## 2025-04-23 DIAGNOSIS — Z09 STATUS POST UMBILICAL HERNIA REPAIR, FOLLOW-UP EXAM: ICD-10-CM

## 2025-04-23 DIAGNOSIS — K42.9 PERIUMBILICAL HERNIA: Primary | ICD-10-CM

## 2025-04-23 PROCEDURE — 99213 OFFICE O/P EST LOW 20 MIN: CPT | Performed by: SURGERY

## 2025-04-23 NOTE — ASSESSMENT & PLAN NOTE
Unfortunate she has been having persistent abdominal wall pains.  She has had abdominal plasty followed by umbilical hernia repair.  There is no evidence of recurrent hernia I can appreciate on exam.  I tried to explain to her that some of the areas she is palpating a result of her abdominoplasty as she likely had a diastases prior to her surgery and the rectus muscle pexy  can have some slight asymmetry in the midline.  However with her persistent pains we will plan to get a CT abdomen to evaluate the abdominal wall for any other abnormalities.  I will contact her with results.    Orders:    CT abdomen pelvis without contrast; Future

## 2025-04-23 NOTE — PROGRESS NOTES
Name: Fredy Tran      : 1980      MRN: [id number]  Encounter Provider: Roman Almeida MD  Encounter Date: 2025   Encounter department: [facility] [place]  :  Assessment & Plan  Periumbilical hernia    Orders:    CT abdomen pelvis without contrast; Future    Status post umbilical hernia repair, follow-up exam  Unfortunate she has been having persistent abdominal wall pains.  She has had abdominal plasty followed by umbilical hernia repair.  There is no evidence of recurrent hernia I can appreciate on exam.  I tried to explain to her that some of the areas she is palpating a result of her abdominoplasty as she likely had a diastases prior to her surgery and the rectus muscle pexy  can have some slight asymmetry in the midline.  However with her persistent pains we will plan to get a CT abdomen to evaluate the abdominal wall for any other abnormalities.  I will contact her with results.    Orders:    CT abdomen pelvis without contrast; Future        History of Present Illness   Fredy Tran is a 45 y.o. female who presents for evaluation of abdominal pain.  She has history of abdominal plasty few years ago and had a very small umbilical hernia repair performed by me in 2024.  She reports persistent pain at her umbilicus and rating just down to the right.  She also noticed some bulging inferior to the umbilicus in her midline abdominal wall.  She is very concerned about this ongoing pain feels that she cannot stand for long times or do any activities.  She has been seen by another surgeon and had ultrasound of the area that does not reveal hernia.  HPI  Review of Systems   Constitutional:  Negative for appetite change, chills and fever.   HENT:  Negative for congestion and ear pain.    Eyes:  Negative for discharge and itching.   Respiratory:  Negative for chest tightness and shortness of breath.    Cardiovascular:  Negative for chest pain and palpitations.  "  Gastrointestinal:  Positive for abdominal pain.   Musculoskeletal:  Negative for arthralgias and gait problem.   Skin:  Negative for color change and rash.   Neurological:  Negative for dizziness and numbness.   Psychiatric/Behavioral:  Negative for agitation and confusion.     as per HPI.       Objective   /64 (BP Location: Left arm, Patient Position: Sitting, Cuff Size: Adult)   Pulse 98   Temp 98 °F (36.7 °C) (Tympanic)   Resp 16   Ht 5' 5\" (1.651 m)   Wt 63.5 kg (140 lb)   LMP 03/29/2025 (Exact Date)   SpO2 98%   BMI 23.30 kg/m²      Physical Exam  Vitals and nursing note reviewed.   Constitutional:       General: She is not in acute distress.     Appearance: She is well-developed. She is not diaphoretic.   HENT:      Head: Normocephalic and atraumatic.   Eyes:      Pupils: Pupils are equal, round, and reactive to light.   Cardiovascular:      Rate and Rhythm: Normal rate and regular rhythm.   Pulmonary:      Effort: Pulmonary effort is normal. No respiratory distress.   Abdominal:      General: Bowel sounds are normal.      Palpations: Abdomen is soft.      Comments: Abdominal plasty scars.  Umbilical scar well-healed.  No evidence of recurrent hernia.  Mild separation inferior to the umbilicus which is the bulging she is concerned about which is more of a diastases and not a hernia.   Musculoskeletal:         General: Normal range of motion.      Cervical back: Normal range of motion and neck supple.   Skin:     General: Skin is warm and dry.   Neurological:      Mental Status: She is alert and oriented to person, place, and time.   Psychiatric:         Behavior: Behavior normal.                 "

## (undated) DEVICE — BINDER ABDOMINAL 30-45 IN

## (undated) DEVICE — GLOVE SRG BIOGEL 6.5

## (undated) DEVICE — ANTIBACTERIAL UNDYED BRAIDED (POLYGLACTIN 910), SYNTHETIC ABSORBABLE SUTURE: Brand: COATED VICRYL

## (undated) DEVICE — DRAPE LAPAROTOMY W/POUCHES

## (undated) DEVICE — EXOFIN PRECISION PEN HIGH VISCOSITY TOPICAL SKIN ADHESIVE: Brand: EXOFIN PRECISION PEN, 1G

## (undated) DEVICE — GLOVE INDICATOR PI UNDERGLOVE SZ 6.5 BLUE

## (undated) DEVICE — SUT VICRYL 0 CT-1 36 IN J946H

## (undated) DEVICE — TUBING SUCTION 5MM X 12 FT

## (undated) DEVICE — INTENDED FOR TISSUE SEPARATION, AND OTHER PROCEDURES THAT REQUIRE A SHARP SURGICAL BLADE TO PUNCTURE OR CUT.: Brand: BARD-PARKER SAFETY BLADES SIZE 15, STERILE

## (undated) DEVICE — 2000CC GUARDIAN II: Brand: GUARDIAN

## (undated) DEVICE — MEDI-VAC YANKAUER SUCTION HANDLE W/BULBOUS AND CONTROL VENT: Brand: CARDINAL HEALTH

## (undated) DEVICE — DRAPE EQUIPMENT RF WAND

## (undated) DEVICE — GLOVE INDICATOR PI UNDERGLOVE SZ 8 BLUE

## (undated) DEVICE — PLUMEPEN PRO 10FT

## (undated) DEVICE — SUT VICRYL 2-0 SH 27 IN UNDYED J417H

## (undated) DEVICE — BETHLEHEM UNIVERSAL MINOR GEN: Brand: CARDINAL HEALTH

## (undated) DEVICE — SCD SEQUENTIAL COMPRESSION COMFORT SLEEVE MEDIUM KNEE LENGTH: Brand: KENDALL SCD

## (undated) DEVICE — CHLORAPREP HI-LITE 26ML ORANGE

## (undated) DEVICE — GLOVE SRG BIOGEL ECLIPSE 7.5